# Patient Record
Sex: FEMALE | Race: WHITE | Employment: OTHER | URBAN - METROPOLITAN AREA
[De-identification: names, ages, dates, MRNs, and addresses within clinical notes are randomized per-mention and may not be internally consistent; named-entity substitution may affect disease eponyms.]

---

## 2018-08-08 ENCOUNTER — HOSPITAL ENCOUNTER (EMERGENCY)
Age: 68
Discharge: HOME | End: 2018-08-08
Attending: EMERGENCY MEDICINE | Admitting: EMERGENCY MEDICINE
Payer: MEDICARE

## 2018-08-08 ENCOUNTER — APPOINTMENT (OUTPATIENT)
Dept: GENERAL RADIOLOGY | Age: 68
End: 2018-08-08
Attending: EMERGENCY MEDICINE
Payer: MEDICARE

## 2018-08-08 VITALS
DIASTOLIC BLOOD PRESSURE: 83 MMHG | WEIGHT: 125 LBS | TEMPERATURE: 97.7 F | SYSTOLIC BLOOD PRESSURE: 183 MMHG | BODY MASS INDEX: 20.09 KG/M2 | RESPIRATION RATE: 16 BRPM | HEIGHT: 66 IN | HEART RATE: 60 BPM | OXYGEN SATURATION: 100 %

## 2018-08-08 DIAGNOSIS — S82.65XA CLOSED NONDISPLACED FRACTURE OF LATERAL MALLEOLUS OF LEFT FIBULA, INITIAL ENCOUNTER: Primary | ICD-10-CM

## 2018-08-08 PROCEDURE — 99283 EMERGENCY DEPT VISIT LOW MDM: CPT

## 2018-08-08 PROCEDURE — 73610 X-RAY EXAM OF ANKLE: CPT

## 2018-08-08 PROCEDURE — 75810000053 HC SPLINT APPLICATION

## 2018-08-08 PROCEDURE — 77030019945 HC PDNG CST 3M -A

## 2018-08-08 RX ORDER — TRAZODONE HYDROCHLORIDE 50 MG/1
50 TABLET ORAL
COMMUNITY

## 2018-08-08 NOTE — ED TRIAGE NOTES
Pt was wheeled to the treatment area. Pt states \"On Monday night I was kneeling on the floor playing with a puppy and my boyfriend stepped on my left foot by accident hurting my ankle. It still hurts. \"

## 2018-08-08 NOTE — ED PROVIDER NOTES
Patient is a 78 y.o. female presenting with ankle pain. The history is provided by the patient. Ankle Pain    This is a new problem. The current episode started 2 days ago. The problem occurs constantly. The pain is present in the left ankle. The quality of the pain is described as aching. The pain is at a severity of 1/10. There has been a history of trauma ( accidently stepped on her as he got up from couch). Past Medical History:   Diagnosis Date    Arthritis     osteoperosis    Hypertension     Psychiatric disorder     anxiety       Past Surgical History:   Procedure Laterality Date    HX GYN      Tubal ligation    HX HYSTERECTOMY      HX ORTHOPAEDIC  2013    right heel fx repair         History reviewed. No pertinent family history. Social History     Social History    Marital status:      Spouse name: N/A    Number of children: N/A    Years of education: N/A     Occupational History    Not on file. Social History Main Topics    Smoking status: Former Smoker     Packs/day: 0.50    Smokeless tobacco: Former User     Quit date: 8/8/2015    Alcohol use 8.4 oz/week     7 Shots of liquor, 7 Glasses of wine per week    Drug use: No    Sexual activity: Not on file     Other Topics Concern    Not on file     Social History Narrative         ALLERGIES: Review of patient's allergies indicates no known allergies. Review of Systems   Musculoskeletal:        Left ankle pain - other wise no other issues   remaining ROS neg as patient only has the left ankle pain    Vitals:    08/08/18 0959   BP: (!) 196/92   Pulse: 72   Resp: 16   Temp: 97.7 °F (36.5 °C)   SpO2: 100%   Weight: 56.7 kg (125 lb)   Height: 5' 6\" (1.676 m)            Physical Exam   Constitutional: She appears well-developed and well-nourished. Cardiovascular: Normal rate, regular rhythm and intact distal pulses. Musculoskeletal: She exhibits tenderness. She exhibits no deformity.         Left ankle: She exhibits swelling and ecchymosis. Tenderness. Lateral malleolus tenderness found. Feet:    Nursing note and vitals reviewed. MDM  Number of Diagnoses or Management Options  Closed nondisplaced fracture of lateral malleolus of left fibula, initial encounter:   Diagnosis management comments: Left ankle injury - fracture vs contusion vs sprain - check x-ray and re-eval.  Patient declined analgesia at time of my initial exam.    1100 - patient to be splinted and d/c to [facility] - she requested someone other than Dr. Aubery Sandifer or his group - referred to Dr. Leelee Elliott of [facility]  Splint applied with nurse and tech assistance       Amount and/or Complexity of Data Reviewed  Tests in the radiology section of CPT®: ordered and reviewed          ED Course       Splint, Ankle  Date/Time: 8/8/2018 11:10 AM  Performed by: Tierney Viveros  Authorized by: Tierney JEWELL     Consent:     Consent obtained:  Verbal    Consent given by:  Patient    Risks discussed:  Pain    Alternatives discussed:  Referral  Pre-procedure details:     Sensation:  Normal  Procedure details:     Laterality:  Left    Location:  Ankle    Ankle:  L ankle    Splint type: posterior wtih ankle stirrup. Supplies:  Ortho-Glass, cotton padding and elastic bandage                 VITALS:   Patient Vitals for the past 8 hrs:   Temp Pulse Resp BP SpO2   08/08/18 0959 97.7 °F (36.5 °C) 72 16 (!) 196/92 100 %        Xr Ankle Lt Min 3 V    Result Date: 8/8/2018  EXAM:  XR ANKLE LT MIN 3 V HISTORY: Tenderness over lateral malleolus INDICATION:  Tenderness over lateral malleolus. COMPARISON: 4/16/2013. FINDINGS: Three views of the left ankle demonstrate an acute minimally displaced distal fibular fracture. Edema over the lateral malleolus. Likely joint effusion. .  There is no other acute osseous or articular abnormality. Tibiotalar articulation is within normal limits. IMPRESSION: Distal fibular minimally displaced fracture.  There is no tibial fracture.

## 2018-08-08 NOTE — DISCHARGE INSTRUCTIONS
Broken Ankle: Care Instructions  Your Care Instructions    An ankle may break (fracture) during sports, a fall, or other accidents. Fractures can range from a small, hairline crack, to a bone or bones broken into two or more pieces. Your treatment depends on how bad the break is. Your doctor may have put your ankle in a splint or cast to allow it to heal or to keep it stable until you see another doctor. It may take weeks or months for your ankle to heal. You can help your ankle heal with some care at home. You heal best when you take good care of yourself. Eat a variety of healthy foods, and don't smoke. You may have had a sedative to help you relax. You may be unsteady after having sedation. It can take a few hours for the medicine's effects to wear off. Common side effects of sedation include nausea, vomiting, and feeling sleepy or tired. The doctor has checked you carefully, but problems can develop later. If you notice any problems or new symptoms,  get medical treatment right away. Follow-up care is a key part of your treatment and safety. Be sure to make and go to all appointments, and call your doctor if you are having problems. It's also a good idea to know your test results and keep a list of the medicines you take. How can you care for yourself at home? · If the doctor gave you a sedative:  ¨ For 24 hours, don't do anything that requires attention to detail. It takes time for the medicine's effects to completely wear off. ¨ For your safety, do not drive or operate any machinery that could be dangerous. Wait until the medicine wears off and you can think clearly and react easily. · Put ice or a cold pack on your ankle for 10 to 20 minutes at a time. Try to do this every 1 to 2 hours for the next 3 days (when you are awake). Put a thin cloth between the ice and your cast or splint. Keep your cast or splint dry. · Follow the cast care instructions your doctor gives you.  If you have a splint, do not take it off unless your doctor tells you to. · Be safe with medicines. Take pain medicines exactly as directed. ¨ If the doctor gave you a prescription medicine for pain, take it as prescribed. ¨ If you are not taking a prescription pain medicine, ask your doctor if you can take an over-the-counter medicine. · Prop up your leg on pillows in the first few days after the injury. Keep the ankle higher than the level of your heart. This will help reduce swelling. · Do not put weight on your ankle unless your doctor tells you to. Use crutches to walk. · Follow instructions for exercises to keep your leg strong. · Wiggle your toes often to reduce swelling and stiffness. When should you call for help? Call 911 anytime you think you may need emergency care. For example, call if:    · You have chest pain, are short of breath, or you cough up blood.     · You are very sleepy and you have trouble waking up.    Call your doctor now or seek immediate medical care if:    · You have new or worse nausea or vomiting.     · You have new or worse pain.     · Your foot is cool or pale or changes color.     · You have tingling, weakness, or numbness in your toes.     · Your cast or splint feels too tight.     · You have signs of a blood clot in your leg (called a deep vein thrombosis), such as:  ¨ Pain in your calf, back of the knee, thigh, or groin. ¨ Redness or swelling in your leg.    Watch closely for changes in your health, and be sure to contact your doctor if:    · You have a problem with your splint or cast.     · You do not get better as expected. Where can you learn more? Go to http://santi-kat.info/. Enter P763 in the search box to learn more about \"Broken Ankle: Care Instructions. \"  Current as of: November 29, 2017  Content Version: 11.7  © 3059-3003 [employer].  Care instructions adapted under license by [employer] (which disclaims liability or warranty for this information). If you have questions about a medical condition or this instruction, always ask your healthcare professional. [place] [address] any warranty or liability for your use of this information.

## 2018-08-08 NOTE — ED NOTES
Pt was discharged and given instructions by Dr Lamin Chau. Pt verbalized good understanding of all discharge instructions, and F/U care. All questions answered. Pt in stable condition on discharge.

## 2022-08-05 ENCOUNTER — HOSPITAL ENCOUNTER (OUTPATIENT)
Dept: INFUSION THERAPY | Age: 72
Discharge: HOME | End: 2022-08-05

## 2022-08-11 ENCOUNTER — HOSPITAL ENCOUNTER (OUTPATIENT)
Dept: INFUSION THERAPY | Age: 72
Discharge: HOME | End: 2022-08-11
Payer: MEDICARE

## 2022-08-11 VITALS
DIASTOLIC BLOOD PRESSURE: 69 MMHG | HEIGHT: 66 IN | OXYGEN SATURATION: 100 % | TEMPERATURE: 98 F | RESPIRATION RATE: 16 BRPM | SYSTOLIC BLOOD PRESSURE: 143 MMHG | WEIGHT: 128.9 LBS | BODY MASS INDEX: 20.72 KG/M2 | HEART RATE: 69 BPM

## 2022-08-11 LAB
ANION GAP BLD CALC-SCNC: 8 MMOL/L (ref 10–20)
CA-I BLD-MCNC: 1.31 MMOL/L (ref 1.12–1.32)
CHLORIDE BLD-SCNC: 102 MMOL/L (ref 98–107)
CO2 BLD-SCNC: 25.9 MMOL/L (ref 21–32)
CREAT BLD-MCNC: 0.88 MG/DL (ref 0.6–1.3)
GLUCOSE BLD-MCNC: 78 MG/DL (ref 65–100)
MAGNESIUM SERPL-MCNC: 2 MG/DL (ref 1.6–2.4)
PHOSPHATE SERPL-MCNC: 4.7 MG/DL (ref 2.6–4.7)
POTASSIUM BLD-SCNC: 3.8 MMOL/L (ref 3.5–5.1)
SERVICE CMNT-IMP: ABNORMAL
SODIUM BLD-SCNC: 135 MMOL/L (ref 136–145)

## 2022-08-11 PROCEDURE — 96372 THER/PROPH/DIAG INJ SC/IM: CPT

## 2022-08-11 PROCEDURE — 80047 BASIC METABLC PNL IONIZED CA: CPT

## 2022-08-11 PROCEDURE — 36415 COLL VENOUS BLD VENIPUNCTURE: CPT

## 2022-08-11 PROCEDURE — 80048 BASIC METABOLIC PNL TOTAL CA: CPT

## 2022-08-11 PROCEDURE — 83735 ASSAY OF MAGNESIUM: CPT

## 2022-08-11 PROCEDURE — 74011250636 HC RX REV CODE- 250/636: Performed by: FAMILY MEDICINE

## 2022-08-11 PROCEDURE — 84100 ASSAY OF PHOSPHORUS: CPT

## 2022-08-11 RX ORDER — TRAZODONE HYDROCHLORIDE 50 MG/1
TABLET ORAL
COMMUNITY

## 2022-08-11 RX ORDER — LOSARTAN POTASSIUM 100 MG/1
100 TABLET ORAL DAILY
COMMUNITY

## 2022-08-11 RX ORDER — NITROFURANTOIN (MACROCRYSTALS) 100 MG/1
CAPSULE ORAL
COMMUNITY
Start: 2022-08-08 | End: 2022-08-15

## 2022-08-11 RX ORDER — AMLODIPINE BESYLATE 5 MG/1
5 TABLET ORAL DAILY
COMMUNITY

## 2022-08-11 RX ADMIN — DENOSUMAB 60 MG: 60 INJECTION SUBCUTANEOUS at 08:41

## 2022-08-11 NOTE — PROGRESS NOTES
[facility] Progress Note    Date: 2022    Name: Anne Rojas    MRN: [id number]         : 1950    Ms. Sky arrived ambulatory and in no distress for Prolia Injection. Assessment was completed, no acute issues at this time, no new complaints voiced. Labs drawn and sent for processing. Patient denies any recent or planned dental work. Ms. Jono Chen vitals were reviewed. Visit Vitals  BP (!) 143/69 (BP 1 Location: Left upper arm, BP Patient Position: Sitting)   Pulse 69   Temp 98 °F (36.7 °C)   Resp 16   Ht 5' 5.75\" (1.67 m)   Wt 58.5 kg (128 lb 14.4 oz)   SpO2 100%   Breastfeeding No   BMI 20.96 kg/m²       Recent Results (from the past 24 hour(s))   POC CHEM8    Collection Time: 22  8:21 AM   Result Value Ref Range    Calcium, ionized (POC) 1.31 1.12 - 1.32 mmol/L    Sodium (POC) 135 (L) 136 - 145 mmol/L    Potassium (POC) 3.8 3.5 - 5.1 mmol/L    Chloride (POC) 102 98 - 107 mmol/L    CO2 (POC) 25.9 21 - 32 mmol/L    Anion gap (POC) 8 (L) 10 - 20 mmol/L    Glucose (POC) 78 65 - 100 mg/dL    Creatinine (POC) 0.88 0.6 - 1.3 mg/dL    GFRAA, POC >60 >60 ml/min/1.73m2    GFRNA, POC >60 >60 ml/min/1.73m2    Comment Comment Not Indicated. Mag and phos sent to the lab and still in process. Medications:  Medications Administered       denosumab (PROLIA) injection 60 mg       Admin Date  2022 Action  Given Dose  60 mg Route  SubCUTAneous Administered By  Tyler Brown RN                     Ms. Francisco Newton tolerated treatment well and was discharged from [place] [address] in stable condition. She is to return on  at 1300 for her next appointment.     Jamie Marley RN  2022

## 2022-09-14 ENCOUNTER — TRANSCRIBE ORDER (OUTPATIENT)
Dept: SCHEDULING | Age: 72
End: 2022-09-14

## 2022-09-14 DIAGNOSIS — Z12.31 ENCOUNTER FOR SCREENING MAMMOGRAM FOR MALIGNANT NEOPLASM OF BREAST: Primary | ICD-10-CM

## 2022-09-15 ENCOUNTER — TRANSCRIBE ORDER (OUTPATIENT)
Dept: SCHEDULING | Age: 72
End: 2022-09-15

## 2022-09-15 DIAGNOSIS — Z13.6 SCREENING FOR CARDIOVASCULAR CONDITION: Primary | ICD-10-CM

## 2022-09-15 DIAGNOSIS — Z78.0 ASYMPTOMATIC MENOPAUSAL STATE: Primary | ICD-10-CM

## 2022-09-15 DIAGNOSIS — G45.9 TRANSIENT CEREBRAL ISCHEMIA: Primary | ICD-10-CM

## 2022-10-11 ENCOUNTER — HOSPITAL ENCOUNTER (OUTPATIENT)
Dept: MRI IMAGING | Age: 72
Discharge: HOME | End: 2022-10-11
Attending: FAMILY MEDICINE
Payer: MEDICARE

## 2022-10-11 ENCOUNTER — HOSPITAL ENCOUNTER (OUTPATIENT)
Dept: CT IMAGING | Age: 72
Discharge: HOME | End: 2022-10-11
Attending: FAMILY MEDICINE
Payer: SELF-PAY

## 2022-10-11 VITALS — BODY MASS INDEX: 20.49 KG/M2 | WEIGHT: 126 LBS

## 2022-10-11 DIAGNOSIS — Z13.6 SCREENING FOR CARDIOVASCULAR CONDITION: ICD-10-CM

## 2022-10-11 DIAGNOSIS — G45.9 TRANSIENT CEREBRAL ISCHEMIA: ICD-10-CM

## 2022-10-11 PROCEDURE — 75571 CT HRT W/O DYE W/CA TEST: CPT

## 2022-10-11 PROCEDURE — 70553 MRI BRAIN STEM W/O & W/DYE: CPT

## 2022-10-11 PROCEDURE — 74011250636 HC RX REV CODE- 250/636: Performed by: RADIOLOGY

## 2022-10-11 PROCEDURE — A9576 INJ PROHANCE MULTIPACK: HCPCS | Performed by: RADIOLOGY

## 2022-10-11 RX ADMIN — GADOTERIDOL 11 ML: 279.3 INJECTION, SOLUTION INTRAVENOUS at 10:26

## 2022-10-19 ENCOUNTER — HOSPITAL ENCOUNTER (OUTPATIENT)
Dept: MAMMOGRAPHY | Age: 72
Discharge: HOME | End: 2022-10-19
Attending: FAMILY MEDICINE
Payer: MEDICARE

## 2022-10-19 DIAGNOSIS — Z12.31 ENCOUNTER FOR SCREENING MAMMOGRAM FOR MALIGNANT NEOPLASM OF BREAST: ICD-10-CM

## 2022-10-19 DIAGNOSIS — Z78.0 ASYMPTOMATIC MENOPAUSAL STATE: ICD-10-CM

## 2022-10-19 PROCEDURE — 77067 SCR MAMMO BI INCL CAD: CPT

## 2022-10-19 PROCEDURE — 77080 DXA BONE DENSITY AXIAL: CPT

## 2023-02-13 ENCOUNTER — HOSPITAL ENCOUNTER (OUTPATIENT)
Dept: INFUSION THERAPY | Age: 73
End: 2023-02-13

## 2023-02-27 ENCOUNTER — HOSPITAL ENCOUNTER (OUTPATIENT)
Dept: INFUSION THERAPY | Age: 73
Discharge: HOME | End: 2023-02-27
Payer: MEDICARE

## 2023-02-27 VITALS
SYSTOLIC BLOOD PRESSURE: 113 MMHG | DIASTOLIC BLOOD PRESSURE: 63 MMHG | OXYGEN SATURATION: 98 % | TEMPERATURE: 97.8 F | RESPIRATION RATE: 16 BRPM | BODY MASS INDEX: 21.99 KG/M2 | HEIGHT: 65 IN | WEIGHT: 132 LBS | HEART RATE: 75 BPM

## 2023-02-27 LAB
ANION GAP BLD CALC-SCNC: 13 MMOL/L (ref 10–20)
CA-I BLD-MCNC: 1.15 MMOL/L (ref 1.12–1.32)
CHLORIDE BLD-SCNC: 103 MMOL/L (ref 98–107)
CO2 BLD-SCNC: 26.1 MMOL/L (ref 21–32)
CREAT BLD-MCNC: 0.89 MG/DL (ref 0.6–1.3)
GLUCOSE BLD-MCNC: 110 MG/DL (ref 65–100)
MAGNESIUM SERPL-MCNC: 2 MG/DL (ref 1.6–2.4)
PHOSPHATE SERPL-MCNC: 3.6 MG/DL (ref 2.6–4.7)
POTASSIUM BLD-SCNC: 3.8 MMOL/L (ref 3.5–5.1)
SERVICE CMNT-IMP: ABNORMAL
SODIUM BLD-SCNC: 141 MMOL/L (ref 136–145)

## 2023-02-27 PROCEDURE — 80047 BASIC METABLC PNL IONIZED CA: CPT

## 2023-02-27 PROCEDURE — 36415 COLL VENOUS BLD VENIPUNCTURE: CPT

## 2023-02-27 PROCEDURE — 83735 ASSAY OF MAGNESIUM: CPT

## 2023-02-27 PROCEDURE — 80048 BASIC METABOLIC PNL TOTAL CA: CPT

## 2023-02-27 PROCEDURE — 84100 ASSAY OF PHOSPHORUS: CPT

## 2023-02-27 PROCEDURE — 74011250636 HC RX REV CODE- 250/636: Performed by: FAMILY MEDICINE

## 2023-02-27 PROCEDURE — 96372 THER/PROPH/DIAG INJ SC/IM: CPT

## 2023-02-27 RX ADMIN — DENOSUMAB 60 MG: 60 INJECTION SUBCUTANEOUS at 11:53

## 2023-06-28 ENCOUNTER — HOSPITAL ENCOUNTER (OUTPATIENT)
Facility: HOSPITAL | Age: 73
Discharge: HOME | End: 2023-07-01
Payer: MEDICARE

## 2023-06-28 DIAGNOSIS — R06.09 DYSPNEA ON EXERTION: ICD-10-CM

## 2023-06-28 PROCEDURE — 71046 X-RAY EXAM CHEST 2 VIEWS: CPT

## 2023-08-21 ENCOUNTER — APPOINTMENT (OUTPATIENT)
Dept: INFUSION THERAPY | Age: 73
End: 2023-08-21
Payer: MEDICARE

## 2023-08-23 PROBLEM — M81.0 OSTEOPOROSIS: Status: ACTIVE | Noted: 2023-08-23

## 2023-08-23 NOTE — PROGRESS NOTES
New/updated order required for patient's upcoming [facility] appointment. Faxed doctor's office on 08/23/23 at 8:17 AM.  Refer to fax documents in pharmacy for further information.

## 2023-08-28 ENCOUNTER — APPOINTMENT (OUTPATIENT)
Dept: INFUSION THERAPY | Age: 73
End: 2023-08-28

## 2023-08-28 ENCOUNTER — HOSPITAL ENCOUNTER (OUTPATIENT)
Facility: HOSPITAL | Age: 73
Setting detail: INFUSION SERIES
End: 2023-08-28
Payer: MEDICARE

## 2023-08-28 VITALS
SYSTOLIC BLOOD PRESSURE: 118 MMHG | HEART RATE: 65 BPM | HEIGHT: 65 IN | DIASTOLIC BLOOD PRESSURE: 66 MMHG | RESPIRATION RATE: 18 BRPM | OXYGEN SATURATION: 100 % | BODY MASS INDEX: 22.49 KG/M2 | WEIGHT: 135 LBS | TEMPERATURE: 97.9 F

## 2023-08-28 DIAGNOSIS — M81.0 OSTEOPOROSIS, UNSPECIFIED OSTEOPOROSIS TYPE, UNSPECIFIED PATHOLOGICAL FRACTURE PRESENCE: Primary | ICD-10-CM

## 2023-08-28 LAB
ANION GAP BLD CALC-SCNC: 7 MMOL/L (ref 10–20)
CA-I BLD-MCNC: 1.29 MMOL/L (ref 1.12–1.32)
CHLORIDE BLD-SCNC: 105 MMOL/L (ref 98–107)
CO2 BLD-SCNC: 24.8 MMOL/L (ref 21–32)
CREAT BLD-MCNC: 1.02 MG/DL (ref 0.6–1.3)
GLUCOSE BLD-MCNC: 94 MG/DL (ref 65–100)
MAGNESIUM SERPL-MCNC: 2 MG/DL (ref 1.6–2.4)
PHOSPHATE SERPL-MCNC: 5.4 MG/DL (ref 2.6–4.7)
POTASSIUM BLD-SCNC: 4.7 MMOL/L (ref 3.5–5.1)
SERVICE CMNT-IMP: ABNORMAL
SODIUM BLD-SCNC: 136 MMOL/L (ref 136–145)

## 2023-08-28 PROCEDURE — 96372 THER/PROPH/DIAG INJ SC/IM: CPT

## 2023-08-28 PROCEDURE — 83735 ASSAY OF MAGNESIUM: CPT

## 2023-08-28 PROCEDURE — 80047 BASIC METABLC PNL IONIZED CA: CPT

## 2023-08-28 PROCEDURE — 6360000002 HC RX W HCPCS: Performed by: FAMILY MEDICINE

## 2023-08-28 PROCEDURE — 84100 ASSAY OF PHOSPHORUS: CPT

## 2023-08-28 PROCEDURE — 36415 COLL VENOUS BLD VENIPUNCTURE: CPT

## 2023-08-28 RX ORDER — ROSUVASTATIN CALCIUM 10 MG/1
10 TABLET, COATED ORAL 2 TIMES DAILY
COMMUNITY

## 2023-08-28 RX ADMIN — DENOSUMAB 60 MG: 60 INJECTION SUBCUTANEOUS at 12:10

## 2023-08-28 ASSESSMENT — PAIN SCALES - GENERAL: PAINLEVEL_OUTOF10: 0

## 2024-02-29 ENCOUNTER — HOSPITAL ENCOUNTER (OUTPATIENT)
Facility: HOSPITAL | Age: 74
Setting detail: INFUSION SERIES
End: 2024-02-29

## 2024-03-12 ENCOUNTER — HOSPITAL ENCOUNTER (OUTPATIENT)
Facility: HOSPITAL | Age: 74
Setting detail: INFUSION SERIES
Discharge: HOME | End: 2024-03-12
Payer: MEDICARE

## 2024-03-12 VITALS
SYSTOLIC BLOOD PRESSURE: 147 MMHG | RESPIRATION RATE: 18 BRPM | BODY MASS INDEX: 23.04 KG/M2 | TEMPERATURE: 98.4 F | HEIGHT: 65 IN | WEIGHT: 138.3 LBS | OXYGEN SATURATION: 100 % | HEART RATE: 66 BPM | DIASTOLIC BLOOD PRESSURE: 80 MMHG

## 2024-03-12 DIAGNOSIS — M81.0 OSTEOPOROSIS, UNSPECIFIED OSTEOPOROSIS TYPE, UNSPECIFIED PATHOLOGICAL FRACTURE PRESENCE: Primary | ICD-10-CM

## 2024-03-12 LAB
ANION GAP BLD CALC-SCNC: 9.4 MMOL/L (ref 10–20)
CA-I BLD-MCNC: 1.18 MMOL/L (ref 1.12–1.32)
CHLORIDE BLD-SCNC: 103 MMOL/L (ref 98–107)
CO2 BLD-SCNC: 23.6 MMOL/L (ref 21–32)
CREAT BLD-MCNC: 0.99 MG/DL (ref 0.6–1.3)
GLUCOSE BLD-MCNC: 88 MG/DL (ref 65–100)
MAGNESIUM SERPL-MCNC: 1.8 MG/DL (ref 1.6–2.4)
PHOSPHATE SERPL-MCNC: 4.3 MG/DL (ref 2.6–4.7)
POTASSIUM BLD-SCNC: 3.9 MMOL/L (ref 3.5–5.1)
SERVICE CMNT-IMP: ABNORMAL
SODIUM BLD-SCNC: 136 MMOL/L (ref 136–145)

## 2024-03-12 PROCEDURE — 36415 COLL VENOUS BLD VENIPUNCTURE: CPT

## 2024-03-12 PROCEDURE — 6360000002 HC RX W HCPCS: Performed by: FAMILY MEDICINE

## 2024-03-12 PROCEDURE — 96372 THER/PROPH/DIAG INJ SC/IM: CPT

## 2024-03-12 PROCEDURE — 83735 ASSAY OF MAGNESIUM: CPT

## 2024-03-12 PROCEDURE — 84100 ASSAY OF PHOSPHORUS: CPT

## 2024-03-12 PROCEDURE — 80047 BASIC METABLC PNL IONIZED CA: CPT

## 2024-03-12 RX ADMIN — DENOSUMAB 60 MG: 60 INJECTION SUBCUTANEOUS at 15:06

## 2024-03-12 ASSESSMENT — PAIN DESCRIPTION - LOCATION: LOCATION: BACK

## 2024-03-12 ASSESSMENT — PAIN SCALES - GENERAL: PAINLEVEL_OUTOF10: 3

## 2024-03-12 ASSESSMENT — PAIN DESCRIPTION - ORIENTATION: ORIENTATION: LOWER

## 2024-03-12 NOTE — PROGRESS NOTES
hospitals Progress Note    Date: March 12, 2024        1430: Pt arrived ambulatory to hospitals for Prolia in stable condition.  Assessment completed. Labs drawn peripherally and sent for processing.    Labs reviewed. Criteria for treatment was met.    Patient Vitals for the past 12 hrs:   Temp Pulse Resp BP SpO2   03/12/24 1430 98.4 °F (36.9 °C) 66 18 (!) 147/80 100 %       Recent Results (from the past 12 hour(s))   POC CHEM 8    Collection Time: 03/12/24  2:44 PM   Result Value Ref Range    POC Ionized Calcium 1.18 1.12 - 1.32 mmol/L    POC Sodium 136 136 - 145 mmol/L    POC Potassium 3.9 3.5 - 5.1 mmol/L    POC Chloride 103 98 - 107 mmol/L    POC TCO2 23.6 21 - 32 mmol/L    Anion Gap, POC 9.4 (L) 10 - 20 mmol/L    POC Glucose 88 65 - 100 mg/dL    POC Creatinine 0.99 0.6 - 1.3 mg/dL    eGFR, POC >60 >60 ml/min/1.73m2    UA Comment Comment Not Indicated.         Medications Administered         denosumab (PROLIA) SC injection 60 mg Admin Date  03/12/2024 Action  Given Dose  60 mg Route  SubCUTAneous Administered By  Mallory Khan, RN          Given SQ to LANA    1515:  Tolerated treatment well, no adverse reactions noted. D/Cd from hospitals ambulatory and in no distress.  Patient is aware of next scheduled hospitals appointment on 9/13/24.    Future Appointments   Date Time Provider Department Center   9/13/2024  2:30 PM SS INF5 CH4 <1H [facility]         Mallory Khan RN  March 12, 2024

## 2024-08-14 ENCOUNTER — HOSPITAL ENCOUNTER (EMERGENCY)
Facility: HOSPITAL | Age: 74
Discharge: HOME | End: 2024-08-14
Attending: STUDENT IN AN ORGANIZED HEALTH CARE EDUCATION/TRAINING PROGRAM
Payer: MEDICARE

## 2024-08-14 ENCOUNTER — APPOINTMENT (OUTPATIENT)
Facility: HOSPITAL | Age: 74
End: 2024-08-14
Payer: MEDICARE

## 2024-08-14 VITALS
SYSTOLIC BLOOD PRESSURE: 115 MMHG | HEIGHT: 65 IN | BODY MASS INDEX: 21.66 KG/M2 | DIASTOLIC BLOOD PRESSURE: 76 MMHG | OXYGEN SATURATION: 98 % | HEART RATE: 76 BPM | RESPIRATION RATE: 16 BRPM | TEMPERATURE: 99.4 F | WEIGHT: 130 LBS

## 2024-08-14 DIAGNOSIS — R11.0 CHRONIC NAUSEA: ICD-10-CM

## 2024-08-14 DIAGNOSIS — K59.00 CONSTIPATION, UNSPECIFIED CONSTIPATION TYPE: Primary | ICD-10-CM

## 2024-08-14 DIAGNOSIS — N12 PYELONEPHRITIS: ICD-10-CM

## 2024-08-14 DIAGNOSIS — R10.31 ABDOMINAL PAIN, RIGHT LOWER QUADRANT: ICD-10-CM

## 2024-08-14 LAB
ALBUMIN SERPL-MCNC: 4 G/DL (ref 3.5–5)
ALBUMIN/GLOB SERPL: 1 (ref 1.1–2.2)
ALP SERPL-CCNC: 83 U/L (ref 45–117)
ALT SERPL-CCNC: 39 U/L (ref 12–78)
ANION GAP SERPL CALC-SCNC: 21 MMOL/L (ref 5–15)
APPEARANCE UR: ABNORMAL
AST SERPL-CCNC: 47 U/L (ref 15–37)
BACTERIA URNS QL MICRO: NEGATIVE /HPF
BASOPHILS # BLD: 0 K/UL (ref 0–0.1)
BASOPHILS NFR BLD: 0 % (ref 0–1)
BILIRUB SERPL-MCNC: 1.1 MG/DL (ref 0.2–1)
BILIRUB UR QL: NEGATIVE
BUN SERPL-MCNC: 16 MG/DL (ref 6–20)
BUN/CREAT SERPL: 11 (ref 12–20)
CALCIUM SERPL-MCNC: 10.2 MG/DL (ref 8.5–10.1)
CHLORIDE SERPL-SCNC: 97 MMOL/L (ref 97–108)
CO2 SERPL-SCNC: 20 MMOL/L (ref 21–32)
COLOR UR: ABNORMAL
CREAT SERPL-MCNC: 1.4 MG/DL (ref 0.55–1.02)
DIFFERENTIAL METHOD BLD: ABNORMAL
EOSINOPHIL # BLD: 0 K/UL (ref 0–0.4)
EOSINOPHIL NFR BLD: 0 % (ref 0–7)
EPITH CASTS URNS QL MICRO: ABNORMAL /LPF
ERYTHROCYTE [DISTWIDTH] IN BLOOD BY AUTOMATED COUNT: 14.2 % (ref 11.5–14.5)
GLOBULIN SER CALC-MCNC: 3.9 G/DL (ref 2–4)
GLUCOSE SERPL-MCNC: 141 MG/DL (ref 65–100)
GLUCOSE UR STRIP.AUTO-MCNC: NEGATIVE MG/DL
HCT VFR BLD AUTO: 35.1 % (ref 35–47)
HGB BLD-MCNC: 12.1 G/DL (ref 11.5–16)
HGB UR QL STRIP: ABNORMAL
IMM GRANULOCYTES # BLD AUTO: 0.1 K/UL (ref 0–0.04)
IMM GRANULOCYTES NFR BLD AUTO: 1 % (ref 0–0.5)
KETONES UR QL STRIP.AUTO: 40 MG/DL
LEUKOCYTE ESTERASE UR QL STRIP.AUTO: NEGATIVE
LIPASE SERPL-CCNC: 107 U/L (ref 13–75)
LYMPHOCYTES # BLD: 0.8 K/UL (ref 0.8–3.5)
LYMPHOCYTES NFR BLD: 9 % (ref 12–49)
MCH RBC QN AUTO: 29.9 PG (ref 26–34)
MCHC RBC AUTO-ENTMCNC: 34.5 G/DL (ref 30–36.5)
MCV RBC AUTO: 86.7 FL (ref 80–99)
MONOCYTES # BLD: 0.5 K/UL (ref 0–1)
MONOCYTES NFR BLD: 6 % (ref 5–13)
NEUTS SEG # BLD: 7.3 K/UL (ref 1.8–8)
NEUTS SEG NFR BLD: 84 % (ref 32–75)
NITRITE UR QL STRIP.AUTO: NEGATIVE
NRBC # BLD: 0 K/UL (ref 0–0.01)
NRBC BLD-RTO: 0 PER 100 WBC
PH UR STRIP: 6 (ref 5–8)
PLATELET # BLD AUTO: 237 K/UL (ref 150–400)
PMV BLD AUTO: 9.5 FL (ref 8.9–12.9)
POTASSIUM SERPL-SCNC: 3.7 MMOL/L (ref 3.5–5.1)
PROT SERPL-MCNC: 7.9 G/DL (ref 6.4–8.2)
PROT UR STRIP-MCNC: ABNORMAL MG/DL
RBC # BLD AUTO: 4.05 M/UL (ref 3.8–5.2)
RBC #/AREA URNS HPF: ABNORMAL /HPF (ref 0–5)
RBC MORPH BLD: ABNORMAL
SODIUM SERPL-SCNC: 138 MMOL/L (ref 136–145)
SP GR UR REFRACTOMETRY: 1.02 (ref 1–1.03)
URINE CULTURE IF INDICATED: ABNORMAL
UROBILINOGEN UR QL STRIP.AUTO: 0.2 EU/DL (ref 0.2–1)
WBC # BLD AUTO: 8.7 K/UL (ref 3.6–11)
WBC URNS QL MICRO: ABNORMAL /HPF (ref 0–4)

## 2024-08-14 PROCEDURE — 96374 THER/PROPH/DIAG INJ IV PUSH: CPT

## 2024-08-14 PROCEDURE — 96375 TX/PRO/DX INJ NEW DRUG ADDON: CPT

## 2024-08-14 PROCEDURE — 87086 URINE CULTURE/COLONY COUNT: CPT

## 2024-08-14 PROCEDURE — 74176 CT ABD & PELVIS W/O CONTRAST: CPT

## 2024-08-14 PROCEDURE — 99284 EMERGENCY DEPT VISIT MOD MDM: CPT

## 2024-08-14 PROCEDURE — 80053 COMPREHEN METABOLIC PANEL: CPT

## 2024-08-14 PROCEDURE — 6360000002 HC RX W HCPCS: Performed by: STUDENT IN AN ORGANIZED HEALTH CARE EDUCATION/TRAINING PROGRAM

## 2024-08-14 PROCEDURE — 81001 URINALYSIS AUTO W/SCOPE: CPT

## 2024-08-14 PROCEDURE — 2580000003 HC RX 258: Performed by: STUDENT IN AN ORGANIZED HEALTH CARE EDUCATION/TRAINING PROGRAM

## 2024-08-14 PROCEDURE — 87147 CULTURE TYPE IMMUNOLOGIC: CPT

## 2024-08-14 PROCEDURE — 36415 COLL VENOUS BLD VENIPUNCTURE: CPT

## 2024-08-14 PROCEDURE — 85025 COMPLETE CBC W/AUTO DIFF WBC: CPT

## 2024-08-14 PROCEDURE — 83690 ASSAY OF LIPASE: CPT

## 2024-08-14 RX ORDER — DOCUSATE SODIUM 100 MG/1
100 CAPSULE, LIQUID FILLED ORAL 2 TIMES DAILY
Qty: 60 CAPSULE | Refills: 0 | Status: SHIPPED | OUTPATIENT
Start: 2024-08-14 | End: 2024-08-14

## 2024-08-14 RX ORDER — METOCLOPRAMIDE 10 MG/1
10 TABLET ORAL 4 TIMES DAILY PRN
Qty: 40 TABLET | Refills: 0 | Status: SHIPPED | OUTPATIENT
Start: 2024-08-14

## 2024-08-14 RX ORDER — ONDANSETRON 2 MG/ML
4 INJECTION INTRAMUSCULAR; INTRAVENOUS ONCE
Status: COMPLETED | OUTPATIENT
Start: 2024-08-14 | End: 2024-08-14

## 2024-08-14 RX ORDER — SULFAMETHOXAZOLE AND TRIMETHOPRIM 800; 160 MG/1; MG/1
1 TABLET ORAL 2 TIMES DAILY
Qty: 20 TABLET | Refills: 0 | Status: SHIPPED | OUTPATIENT
Start: 2024-08-14 | End: 2024-08-24

## 2024-08-14 RX ORDER — MORPHINE SULFATE 4 MG/ML
4 INJECTION, SOLUTION INTRAMUSCULAR; INTRAVENOUS
Status: COMPLETED | OUTPATIENT
Start: 2024-08-14 | End: 2024-08-14

## 2024-08-14 RX ORDER — CEPHALEXIN 500 MG/1
500 CAPSULE ORAL 2 TIMES DAILY
Qty: 10 CAPSULE | Refills: 0 | Status: SHIPPED | OUTPATIENT
Start: 2024-08-14 | End: 2024-08-14 | Stop reason: SINTOL

## 2024-08-14 RX ORDER — 0.9 % SODIUM CHLORIDE 0.9 %
1000 INTRAVENOUS SOLUTION INTRAVENOUS ONCE
Status: COMPLETED | OUTPATIENT
Start: 2024-08-14 | End: 2024-08-14

## 2024-08-14 RX ORDER — SENNA AND DOCUSATE SODIUM 50; 8.6 MG/1; MG/1
1 TABLET, FILM COATED ORAL DAILY PRN
Qty: 30 TABLET | Refills: 0 | Status: SHIPPED | OUTPATIENT
Start: 2024-08-14 | End: 2024-09-13

## 2024-08-14 RX ADMIN — MORPHINE SULFATE 4 MG: 4 INJECTION, SOLUTION INTRAMUSCULAR; INTRAVENOUS at 16:18

## 2024-08-14 RX ADMIN — ONDANSETRON 4 MG: 2 INJECTION INTRAMUSCULAR; INTRAVENOUS at 16:18

## 2024-08-14 RX ADMIN — SODIUM CHLORIDE 1000 ML: 9 INJECTION, SOLUTION INTRAVENOUS at 18:22

## 2024-08-14 RX ADMIN — WATER 1000 MG: 1 INJECTION INTRAMUSCULAR; INTRAVENOUS; SUBCUTANEOUS at 18:22

## 2024-08-14 ASSESSMENT — PAIN SCALES - GENERAL
PAINLEVEL_OUTOF10: 2
PAINLEVEL_OUTOF10: 10
PAINLEVEL_OUTOF10: 4
PAINLEVEL_OUTOF10: 2
PAINLEVEL_OUTOF10: 0

## 2024-08-14 ASSESSMENT — PAIN DESCRIPTION - LOCATION
LOCATION: ABDOMEN

## 2024-08-14 ASSESSMENT — PAIN DESCRIPTION - ORIENTATION
ORIENTATION: RIGHT;LOWER
ORIENTATION: RIGHT
ORIENTATION: RIGHT

## 2024-08-14 ASSESSMENT — PAIN DESCRIPTION - DESCRIPTORS
DESCRIPTORS: DULL
DESCRIPTORS: SHARP
DESCRIPTORS: SHARP

## 2024-08-14 ASSESSMENT — PAIN - FUNCTIONAL ASSESSMENT: PAIN_FUNCTIONAL_ASSESSMENT: PREVENTS OR INTERFERES WITH MANY ACTIVE NOT PASSIVE ACTIVITIES

## 2024-08-14 ASSESSMENT — PAIN DESCRIPTION - FREQUENCY: FREQUENCY: INTERMITTENT

## 2024-08-14 ASSESSMENT — LIFESTYLE VARIABLES: HOW OFTEN DO YOU HAVE A DRINK CONTAINING ALCOHOL: NEVER

## 2024-08-14 NOTE — ED PROVIDER NOTES
[facility] EMERGENCY DEPT  EMERGENCY DEPARTMENT ENCOUNTER      Pt Name: Valeria Lo  MRN: [id number]  Birthdate 1950  Date of evaluation: 8/14/2024  Provider: Beatris Celestin MD    CHIEF COMPLAINT       Chief Complaint   Patient presents with    Abdominal Pain         HISTORY OF PRESENT ILLNESS    Nursing Triage Notes were reviewed.    HPI    Valeria Lo is a 74 y.o. female with a history of HTN, HLD, H pylori (retesting negative), chronic pancreatitis, medical marijuana use who presents to the emergency department for evaluation of nausea, vomiting, abdominal pain. Complains of right lower quadrant pain abdominal pain that began this morning. Complains of new chills this morning, denies known fever. Complains of constipation chronically for > 1 month. Has been having bowel movements, but they are small and hard. Has seen PCP for this, diagnosed with anal fissure, has been taking Miralax, Mg citrate, glycerin suppositories, Fleets enema. Did Miralax daily x 3 days, glycerin suppository and Fleets enema x2 this AM without significant bowel movement. Tried a manual disimpaction herself but no stool within reach. Has had many colonoscopies in the past as her son passed away from colon cancer - last colonoscopy one year ago and normal.  Hx of hysterectomy, no other abdominal surgeries. Drinks at least 2 alcoholic drinks daily.     PAST MEDICAL HISTORY     Past Medical History:   Diagnosis Date    Arthritis     osteoperosis    Hypertension     Psychiatric disorder     anxiety         SURGICAL HISTORY       Past Surgical History:   Procedure Laterality Date    GYN      Tubal ligation    HYSTERECTOMY (CERVIX STATUS UNKNOWN)      ORTHOPEDIC SURGERY  2013    right heel fx repair         CURRENT MEDICATIONS       Discharge Medication List as of 8/14/2024  8:32 PM        CONTINUE these medications which have NOT CHANGED    Details   PANTOPRAZOLE SODIUM PO Take by mouthHistorical Med      rosuvastatin

## 2024-08-14 NOTE — ED TRIAGE NOTES
Patient presents ambulatory to treatment area with a steady gait.  Patient complains of severe right lower abdominal pain that began this morning.  Endorses nausea and vomiting.  Denies known fevers, but states she has had chills this morning.  Additionally states that she has been constipated recently; has used enema and mag citrate at home with no results.  Patient guarding when walking to room.

## 2024-08-14 NOTE — ED NOTES
Bedside and Verbal shift change report received from CON Avila (offgoing nurse). Report included the following information ED SBAR, MAR, and Recent Results.

## 2024-08-15 LAB
BACTERIA SPEC CULT: ABNORMAL
CC UR VC: ABNORMAL
SERVICE CMNT-IMP: ABNORMAL

## 2024-08-15 NOTE — DISCHARGE INSTRUCTIONS
You have been evaluated in the Emergency Department today for abdominal pain. Your evaluation, including urinalysis, blood work, CT scan suggests that your symptoms are due to a urinary tract infection. Please take your prescribed antibiotics for the full course of the medication as directed.    Please follow up with your primary care physician within two days.    Return to the Emergency Department if you experience fevers 100.4° or greater, worsening or uncontrolled pain, vomiting, flank pain, or for any other concerning symptoms.    Thank you for choosing us for your care.

## 2024-08-15 NOTE — FLOWSHEET NOTE
Discharge instructions given to pt by RN. Pt educated on prescribed medications in teach back method and verbalizes understanding. Opportunity for questions provided. Pt ambulatory out of unit, in no acute distress and taken home by .

## 2024-09-25 ENCOUNTER — TRANSCRIBE ORDERS (OUTPATIENT)
Facility: HOSPITAL | Age: 74
End: 2024-09-25

## 2024-09-25 DIAGNOSIS — Z12.31 ENCOUNTER FOR SCREENING MAMMOGRAM FOR MALIGNANT NEOPLASM OF BREAST: Primary | ICD-10-CM

## 2024-09-26 ENCOUNTER — TRANSCRIBE ORDERS (OUTPATIENT)
Facility: HOSPITAL | Age: 74
End: 2024-09-26

## 2024-09-26 DIAGNOSIS — R13.19 OTHER DYSPHAGIA: ICD-10-CM

## 2024-09-26 DIAGNOSIS — K30 FUNCTIONAL DYSPEPSIA: Primary | ICD-10-CM

## 2024-09-30 ENCOUNTER — HOSPITAL ENCOUNTER (OUTPATIENT)
Facility: HOSPITAL | Age: 74
Setting detail: INFUSION SERIES
Discharge: HOME | End: 2024-09-30
Payer: MEDICARE

## 2024-09-30 VITALS
SYSTOLIC BLOOD PRESSURE: 107 MMHG | TEMPERATURE: 98.7 F | HEART RATE: 66 BPM | BODY MASS INDEX: 21.14 KG/M2 | OXYGEN SATURATION: 100 % | HEIGHT: 65 IN | WEIGHT: 126.9 LBS | RESPIRATION RATE: 16 BRPM | DIASTOLIC BLOOD PRESSURE: 55 MMHG

## 2024-09-30 DIAGNOSIS — M81.0 OSTEOPOROSIS, UNSPECIFIED OSTEOPOROSIS TYPE, UNSPECIFIED PATHOLOGICAL FRACTURE PRESENCE: Primary | ICD-10-CM

## 2024-09-30 LAB
ANION GAP BLD CALC-SCNC: 8.7 MMOL/L (ref 10–20)
CA-I BLD-MCNC: 1.15 MMOL/L (ref 1.15–1.33)
CHLORIDE BLD-SCNC: 101 MMOL/L (ref 98–107)
CO2 BLD-SCNC: 24.3 MMOL/L (ref 21–32)
CREAT BLD-MCNC: 1 MG/DL (ref 0.6–1.3)
GLUCOSE BLD-MCNC: 147 MG/DL (ref 74–99)
PHOSPHATE SERPL-MCNC: 2.9 MG/DL (ref 2.6–4.7)
POTASSIUM BLD-SCNC: 3.9 MMOL/L (ref 3.5–5.1)
SERVICE CMNT-IMP: ABNORMAL
SODIUM BLD-SCNC: 134 MMOL/L (ref 136–145)

## 2024-09-30 PROCEDURE — 36415 COLL VENOUS BLD VENIPUNCTURE: CPT

## 2024-09-30 PROCEDURE — 6360000002 HC RX W HCPCS: Performed by: FAMILY MEDICINE

## 2024-09-30 PROCEDURE — 80047 BASIC METABLC PNL IONIZED CA: CPT

## 2024-09-30 PROCEDURE — 84100 ASSAY OF PHOSPHORUS: CPT

## 2024-09-30 PROCEDURE — 96372 THER/PROPH/DIAG INJ SC/IM: CPT

## 2024-09-30 RX ADMIN — DENOSUMAB 60 MG: 60 INJECTION SUBCUTANEOUS at 14:59

## 2024-09-30 ASSESSMENT — PAIN DESCRIPTION - LOCATION: LOCATION: BACK

## 2024-09-30 ASSESSMENT — PAIN SCALES - GENERAL: PAINLEVEL_OUTOF10: 0

## 2024-09-30 NOTE — PROGRESS NOTES
[facility]fusion [facility] Short Visit Progress Note    Ms. Lo admitted to \[facility]\"" for Prolia ambulatory in stable condition. Assessment completed. No new concerns voiced. No dental work to be done.    Vital Signs:  BP (!) 107/55   Pulse 66   Temp 98.7 °F (37.1 °C) (Temporal)   Resp 16   Ht 1.651 m (5' 5\")   Wt 57.6 kg (126 lb 14.4 oz)   SpO2 100%   BMI 21.12 kg/m²       R arm peripheral stick with positive blood return.   Lab Results:  Recent Results (from the past 12 hour(s))   POC CHEM 8    Collection Time: 09/30/24  2:37 PM   Result Value Ref Range    POC Ionized Calcium 1.15 1.15 - 1.33 mmol/L    POC Sodium 134 (L) 136 - 145 mmol/L    POC Potassium 3.9 3.5 - 5.1 mmol/L    POC Chloride 101 98 - 107 mmol/L    POC TCO2 24.3 21 - 32 mmol/L    Anion Gap, POC 8.7 (L) 10 - 20 mmol/L    POC Glucose 147 (H) 74 - 99 mg/dL    POC Creatinine 1.00 0.6 - 1.3 mg/dL    eGFR, POC 59 (L) >60 ml/min/1.73m2    UA Comment Comment Not Indicated.             Medications:  Medications Administered         denosumab (PROLIA) SC injection 60 mg Admin Date  09/30/2024 Action  Given Dose  60 mg Route  SubCUTAneous Documented By  Jossie Mae, RN            R arm SQ    Patient tolerated treatment well. Patient discharged from [facility] ambulatory in no distress. Patient aware of next appointment.    Jossie Mae RN  September 30, 2024    Future Appointments   Date Time Provider Department Center   10/3/2024  9:00 AM [facility] BRIANNA 1 [facility]   10/3/2024 10:00 AM [facility] NM RM 1 [facility]MRNM [facility]   11/19/2024  9:15 AM [facility] FLUORO 1 [facility]MRAD [facility]   3/31/2025  1:30 PM SS FASTTRACK 1 [facility]

## 2024-10-03 ENCOUNTER — HOSPITAL ENCOUNTER (OUTPATIENT)
Facility: HOSPITAL | Age: 74
Discharge: HOME | End: 2024-10-06
Payer: MEDICARE

## 2024-10-03 DIAGNOSIS — Z12.31 ENCOUNTER FOR SCREENING MAMMOGRAM FOR MALIGNANT NEOPLASM OF BREAST: ICD-10-CM

## 2024-10-03 DIAGNOSIS — K30 FUNCTIONAL DYSPEPSIA: ICD-10-CM

## 2024-10-03 PROCEDURE — 3430000000 HC RX DIAGNOSTIC RADIOPHARMACEUTICAL: Performed by: FAMILY MEDICINE

## 2024-10-03 PROCEDURE — A9541 TC99M SULFUR COLLOID: HCPCS | Performed by: FAMILY MEDICINE

## 2024-10-03 PROCEDURE — 77063 BREAST TOMOSYNTHESIS BI: CPT

## 2024-10-03 PROCEDURE — 78264 GASTRIC EMPTYING IMG STUDY: CPT

## 2024-10-03 RX ADMIN — TECHNETIUM TC 99M SULFUR COLLOID 0.3 MILLICURIE: KIT at 09:40

## 2024-12-04 ENCOUNTER — HOSPITAL ENCOUNTER (OUTPATIENT)
Facility: HOSPITAL | Age: 74
Discharge: HOME | End: 2024-12-07
Payer: MEDICARE

## 2024-12-04 DIAGNOSIS — R13.19 OTHER DYSPHAGIA: ICD-10-CM

## 2024-12-04 PROCEDURE — 74220 X-RAY XM ESOPHAGUS 1CNTRST: CPT

## 2025-03-02 ENCOUNTER — APPOINTMENT (OUTPATIENT)
Facility: HOSPITAL | Age: 75
DRG: 242 | End: 2025-03-02
Payer: MEDICARE

## 2025-03-02 ENCOUNTER — HOSPITAL ENCOUNTER (OUTPATIENT)
Facility: HOSPITAL | Age: 75
Setting detail: OBSERVATION
Discharge: HOME | DRG: 243 | End: 2025-03-05
Payer: MEDICARE

## 2025-03-02 ENCOUNTER — HOSPITAL ENCOUNTER (INPATIENT)
Facility: HOSPITAL | Age: 75
LOS: 4 days | Discharge: HOME | DRG: 242 | End: 2025-03-07
Attending: EMERGENCY MEDICINE | Admitting: HOSPITALIST
Payer: MEDICARE

## 2025-03-02 DIAGNOSIS — R00.1 BRADYCARDIA: ICD-10-CM

## 2025-03-02 DIAGNOSIS — I45.5 SINUS PAUSE: Primary | ICD-10-CM

## 2025-03-02 DIAGNOSIS — R53.83 OTHER FATIGUE: ICD-10-CM

## 2025-03-02 DIAGNOSIS — E87.20 METABOLIC ACIDOSIS: ICD-10-CM

## 2025-03-02 DIAGNOSIS — E87.20 LACTIC ACIDOSIS: ICD-10-CM

## 2025-03-02 DIAGNOSIS — E16.2 HYPOGLYCEMIA: ICD-10-CM

## 2025-03-02 DIAGNOSIS — R55 SYNCOPE, UNSPECIFIED SYNCOPE TYPE: ICD-10-CM

## 2025-03-02 DIAGNOSIS — T68.XXXA HYPOTHERMIA, INITIAL ENCOUNTER: ICD-10-CM

## 2025-03-02 LAB
ALBUMIN SERPL-MCNC: 4.3 G/DL (ref 3.5–5)
ALBUMIN/GLOB SERPL: 1.3 (ref 1.1–2.2)
ALP SERPL-CCNC: 71 U/L (ref 45–117)
ALT SERPL-CCNC: 25 U/L (ref 12–78)
AMPHET UR QL SCN: NEGATIVE
ANION GAP SERPL CALC-SCNC: 22 MMOL/L (ref 2–12)
APPEARANCE UR: CLEAR
AST SERPL-CCNC: 38 U/L (ref 15–37)
BACTERIA URNS QL MICRO: NEGATIVE /HPF
BARBITURATES UR QL SCN: NEGATIVE
BASE DEFICIT BLDV-SCNC: 13.8 MMOL/L
BASOPHILS # BLD: 0.06 K/UL (ref 0–0.1)
BASOPHILS NFR BLD: 0.6 % (ref 0–1)
BENZODIAZ UR QL: NEGATIVE
BILIRUB SERPL-MCNC: 0.9 MG/DL (ref 0.2–1)
BILIRUB UR QL: NEGATIVE
BUN SERPL-MCNC: 23 MG/DL (ref 6–20)
BUN/CREAT SERPL: 21 (ref 12–20)
CALCIUM SERPL-MCNC: 9.8 MG/DL (ref 8.5–10.1)
CANNABINOIDS UR QL SCN: POSITIVE
CHLORIDE SERPL-SCNC: 102 MMOL/L (ref 97–108)
CK SERPL-CCNC: 57 U/L (ref 26–192)
CO2 SERPL-SCNC: 13 MMOL/L (ref 21–32)
COCAINE UR QL SCN: NEGATIVE
COLOR UR: ABNORMAL
CORTIS AM PEAK SERPL-MCNC: 18.2 UG/DL (ref 4.3–22.45)
CREAT SERPL-MCNC: 1.09 MG/DL (ref 0.55–1.02)
DIFFERENTIAL METHOD BLD: ABNORMAL
EOSINOPHIL # BLD: 0.09 K/UL (ref 0–0.4)
EOSINOPHIL NFR BLD: 0.9 % (ref 0–7)
EPITH CASTS URNS QL MICRO: ABNORMAL /LPF
ERYTHROCYTE [DISTWIDTH] IN BLOOD BY AUTOMATED COUNT: 13.2 % (ref 11.5–14.5)
ETHANOL SERPL-MCNC: 37 MG/DL (ref 0–0.08)
FLUAV RNA SPEC QL NAA+PROBE: NOT DETECTED
FLUBV RNA SPEC QL NAA+PROBE: NOT DETECTED
GLOBULIN SER CALC-MCNC: 3.4 G/DL (ref 2–4)
GLUCOSE BLD STRIP.AUTO-MCNC: 101 MG/DL (ref 65–117)
GLUCOSE BLD STRIP.AUTO-MCNC: 127 MG/DL (ref 65–117)
GLUCOSE BLD STRIP.AUTO-MCNC: 146 MG/DL (ref 65–117)
GLUCOSE BLD STRIP.AUTO-MCNC: 174 MG/DL (ref 65–117)
GLUCOSE BLD STRIP.AUTO-MCNC: 255 MG/DL (ref 65–117)
GLUCOSE BLD STRIP.AUTO-MCNC: 78 MG/DL (ref 65–117)
GLUCOSE BLD STRIP.AUTO-MCNC: 93 MG/DL (ref 65–117)
GLUCOSE BLD-MCNC: 233 MG/DL (ref 74–99)
GLUCOSE SERPL-MCNC: 227 MG/DL (ref 65–100)
GLUCOSE UR STRIP.AUTO-MCNC: NEGATIVE MG/DL
HCO3 BLDV-SCNC: 12.6 MMOL/L (ref 23–28)
HCT VFR BLD AUTO: 38.6 % (ref 35–47)
HGB BLD-MCNC: 12.5 G/DL (ref 11.5–16)
HGB UR QL STRIP: NEGATIVE
HYALINE CASTS URNS QL MICRO: ABNORMAL /LPF (ref 0–2)
IMM GRANULOCYTES # BLD AUTO: 0.1 K/UL (ref 0–0.04)
IMM GRANULOCYTES NFR BLD AUTO: 1 % (ref 0–0.5)
KETONES UR QL STRIP.AUTO: 15 MG/DL
LACTATE BLD-SCNC: 7.83 MMOL/L (ref 0.4–2)
LACTATE SERPL-SCNC: 0.6 MMOL/L (ref 0.4–2)
LEUKOCYTE ESTERASE UR QL STRIP.AUTO: ABNORMAL
LIPASE SERPL-CCNC: 110 U/L (ref 13–75)
LYMPHOCYTES # BLD: 3.09 K/UL (ref 0.8–3.5)
LYMPHOCYTES NFR BLD: 32.1 % (ref 12–49)
Lab: ABNORMAL
MAGNESIUM SERPL-MCNC: 2 MG/DL (ref 1.6–2.4)
MCH RBC QN AUTO: 30.4 PG (ref 26–34)
MCHC RBC AUTO-ENTMCNC: 32.4 G/DL (ref 30–36.5)
MCV RBC AUTO: 93.9 FL (ref 80–99)
METHADONE UR QL: NEGATIVE
MONOCYTES # BLD: 0.34 K/UL (ref 0–1)
MONOCYTES NFR BLD: 3.5 % (ref 5–13)
NEUTS SEG # BLD: 5.95 K/UL (ref 1.8–8)
NEUTS SEG NFR BLD: 61.9 % (ref 32–75)
NITRITE UR QL STRIP.AUTO: NEGATIVE
NRBC # BLD: 0 K/UL (ref 0–0.01)
NRBC BLD-RTO: 0 PER 100 WBC
OPIATES UR QL: NEGATIVE
PCO2 BLDV: 30.4 MMHG (ref 41–51)
PCP UR QL: NEGATIVE
PH BLDV: 7.23 (ref 7.32–7.42)
PH UR STRIP: 5.5 (ref 5–8)
PHOSPHATE SERPL-MCNC: 2.9 MG/DL (ref 2.6–4.7)
PLATELET # BLD AUTO: 252 K/UL (ref 150–400)
PMV BLD AUTO: 9.7 FL (ref 8.9–12.9)
PO2 BLDV: 50 MMHG (ref 25–40)
POTASSIUM SERPL-SCNC: 3.1 MMOL/L (ref 3.5–5.1)
PROCALCITONIN SERPL-MCNC: <0.05 NG/ML
PROT SERPL-MCNC: 7.7 G/DL (ref 6.4–8.2)
PROT UR STRIP-MCNC: NEGATIVE MG/DL
RBC # BLD AUTO: 4.11 M/UL (ref 3.8–5.2)
RBC #/AREA URNS HPF: ABNORMAL /HPF (ref 0–5)
SAO2 % BLDV: 78.5 % (ref 65–88)
SARS-COV-2 RNA RESP QL NAA+PROBE: NOT DETECTED
SERVICE CMNT-IMP: ABNORMAL
SERVICE CMNT-IMP: NORMAL
SODIUM SERPL-SCNC: 137 MMOL/L (ref 136–145)
SOURCE: NORMAL
SP GR UR REFRACTOMETRY: 1.01 (ref 1–1.03)
SPECIMEN TYPE: ABNORMAL
TROPONIN I SERPL HS-MCNC: 11 NG/L (ref 0–51)
TROPONIN I SERPL HS-MCNC: 29 NG/L (ref 0–51)
TSH SERPL DL<=0.05 MIU/L-ACNC: 2.48 UIU/ML (ref 0.36–3.74)
URINE CULTURE IF INDICATED: ABNORMAL
UROBILINOGEN UR QL STRIP.AUTO: 0.2 EU/DL (ref 0.2–1)
WBC # BLD AUTO: 9.6 K/UL (ref 3.6–11)
WBC URNS QL MICRO: ABNORMAL /HPF (ref 0–4)

## 2025-03-02 PROCEDURE — 96361 HYDRATE IV INFUSION ADD-ON: CPT

## 2025-03-02 PROCEDURE — 96375 TX/PRO/DX INJ NEW DRUG ADDON: CPT

## 2025-03-02 PROCEDURE — 83735 ASSAY OF MAGNESIUM: CPT

## 2025-03-02 PROCEDURE — G0378 HOSPITAL OBSERVATION PER HR: HCPCS

## 2025-03-02 PROCEDURE — 2580000003 HC RX 258: Performed by: HOSPITALIST

## 2025-03-02 PROCEDURE — 96372 THER/PROPH/DIAG INJ SC/IM: CPT

## 2025-03-02 PROCEDURE — 87040 BLOOD CULTURE FOR BACTERIA: CPT

## 2025-03-02 PROCEDURE — 6360000002 HC RX W HCPCS: Performed by: FAMILY MEDICINE

## 2025-03-02 PROCEDURE — 87636 SARSCOV2 & INF A&B AMP PRB: CPT

## 2025-03-02 PROCEDURE — 82533 TOTAL CORTISOL: CPT

## 2025-03-02 PROCEDURE — 84100 ASSAY OF PHOSPHORUS: CPT

## 2025-03-02 PROCEDURE — 71260 CT THORAX DX C+: CPT

## 2025-03-02 PROCEDURE — 85025 COMPLETE CBC W/AUTO DIFF WBC: CPT

## 2025-03-02 PROCEDURE — 83690 ASSAY OF LIPASE: CPT

## 2025-03-02 PROCEDURE — 96374 THER/PROPH/DIAG INJ IV PUSH: CPT

## 2025-03-02 PROCEDURE — 82803 BLOOD GASES ANY COMBINATION: CPT

## 2025-03-02 PROCEDURE — 80307 DRUG TEST PRSMV CHEM ANLYZR: CPT

## 2025-03-02 PROCEDURE — 81001 URINALYSIS AUTO W/SCOPE: CPT

## 2025-03-02 PROCEDURE — 6360000004 HC RX CONTRAST MEDICATION: Performed by: HOSPITALIST

## 2025-03-02 PROCEDURE — 36415 COLL VENOUS BLD VENIPUNCTURE: CPT

## 2025-03-02 PROCEDURE — 80053 COMPREHEN METABOLIC PANEL: CPT

## 2025-03-02 PROCEDURE — 82962 GLUCOSE BLOOD TEST: CPT

## 2025-03-02 PROCEDURE — 82550 ASSAY OF CK (CPK): CPT

## 2025-03-02 PROCEDURE — 6360000002 HC RX W HCPCS: Performed by: HOSPITALIST

## 2025-03-02 PROCEDURE — 99285 EMERGENCY DEPT VISIT HI MDM: CPT

## 2025-03-02 PROCEDURE — 82077 ASSAY SPEC XCP UR&BREATH IA: CPT

## 2025-03-02 PROCEDURE — 84484 ASSAY OF TROPONIN QUANT: CPT

## 2025-03-02 PROCEDURE — 84443 ASSAY THYROID STIM HORMONE: CPT

## 2025-03-02 PROCEDURE — 6370000000 HC RX 637 (ALT 250 FOR IP): Performed by: FAMILY MEDICINE

## 2025-03-02 PROCEDURE — 6360000002 HC RX W HCPCS: Performed by: EMERGENCY MEDICINE

## 2025-03-02 PROCEDURE — 83605 ASSAY OF LACTIC ACID: CPT

## 2025-03-02 PROCEDURE — 84145 PROCALCITONIN (PCT): CPT

## 2025-03-02 PROCEDURE — 94761 N-INVAS EAR/PLS OXIMETRY MLT: CPT

## 2025-03-02 PROCEDURE — 71045 X-RAY EXAM CHEST 1 VIEW: CPT

## 2025-03-02 PROCEDURE — 2580000003 HC RX 258: Performed by: EMERGENCY MEDICINE

## 2025-03-02 PROCEDURE — 2500000003 HC RX 250 WO HCPCS: Performed by: HOSPITALIST

## 2025-03-02 PROCEDURE — 93005 ELECTROCARDIOGRAM TRACING: CPT | Performed by: EMERGENCY MEDICINE

## 2025-03-02 PROCEDURE — 2500000003 HC RX 250 WO HCPCS: Performed by: EMERGENCY MEDICINE

## 2025-03-02 RX ORDER — ENOXAPARIN SODIUM 100 MG/ML
40 INJECTION SUBCUTANEOUS DAILY
Status: DISCONTINUED | OUTPATIENT
Start: 2025-03-02 | End: 2025-03-03

## 2025-03-02 RX ORDER — TRAZODONE HYDROCHLORIDE 50 MG/1
50 TABLET ORAL NIGHTLY
Status: DISCONTINUED | OUTPATIENT
Start: 2025-03-02 | End: 2025-03-07 | Stop reason: HOSPADM

## 2025-03-02 RX ORDER — ONDANSETRON 4 MG/1
4 TABLET, ORALLY DISINTEGRATING ORAL EVERY 8 HOURS PRN
Status: DISCONTINUED | OUTPATIENT
Start: 2025-03-02 | End: 2025-03-07 | Stop reason: HOSPADM

## 2025-03-02 RX ORDER — ACETAMINOPHEN 325 MG/1
650 TABLET ORAL EVERY 6 HOURS PRN
Status: DISCONTINUED | OUTPATIENT
Start: 2025-03-02 | End: 2025-03-07 | Stop reason: HOSPADM

## 2025-03-02 RX ORDER — POLYETHYLENE GLYCOL 3350 17 G/17G
17 POWDER, FOR SOLUTION ORAL DAILY PRN
Status: DISCONTINUED | OUTPATIENT
Start: 2025-03-02 | End: 2025-03-07 | Stop reason: HOSPADM

## 2025-03-02 RX ORDER — ONDANSETRON 2 MG/ML
4 INJECTION INTRAMUSCULAR; INTRAVENOUS ONCE
Status: COMPLETED | OUTPATIENT
Start: 2025-03-02 | End: 2025-03-02

## 2025-03-02 RX ORDER — 0.9 % SODIUM CHLORIDE 0.9 %
600 INTRAVENOUS SOLUTION INTRAVENOUS ONCE
Status: COMPLETED | OUTPATIENT
Start: 2025-03-02 | End: 2025-03-02

## 2025-03-02 RX ORDER — SODIUM CHLORIDE 0.9 % (FLUSH) 0.9 %
5-40 SYRINGE (ML) INJECTION EVERY 12 HOURS SCHEDULED
Status: DISCONTINUED | OUTPATIENT
Start: 2025-03-02 | End: 2025-03-07 | Stop reason: HOSPADM

## 2025-03-02 RX ORDER — METOCLOPRAMIDE HYDROCHLORIDE 5 MG/ML
10 INJECTION INTRAMUSCULAR; INTRAVENOUS ONCE
Status: COMPLETED | OUTPATIENT
Start: 2025-03-02 | End: 2025-03-02

## 2025-03-02 RX ORDER — ROSUVASTATIN CALCIUM 10 MG/1
10 TABLET, COATED ORAL NIGHTLY
Status: DISCONTINUED | OUTPATIENT
Start: 2025-03-02 | End: 2025-03-07 | Stop reason: HOSPADM

## 2025-03-02 RX ORDER — SODIUM CHLORIDE 9 MG/ML
INJECTION, SOLUTION INTRAVENOUS PRN
Status: DISCONTINUED | OUTPATIENT
Start: 2025-03-02 | End: 2025-03-07 | Stop reason: HOSPADM

## 2025-03-02 RX ORDER — SODIUM CHLORIDE 9 MG/ML
INJECTION, SOLUTION INTRAVENOUS CONTINUOUS
Status: DISCONTINUED | OUTPATIENT
Start: 2025-03-02 | End: 2025-03-03

## 2025-03-02 RX ORDER — MAGNESIUM SULFATE IN WATER 40 MG/ML
2000 INJECTION, SOLUTION INTRAVENOUS PRN
Status: DISCONTINUED | OUTPATIENT
Start: 2025-03-02 | End: 2025-03-07 | Stop reason: HOSPADM

## 2025-03-02 RX ORDER — DIATRIZOATE MEGLUMINE AND DIATRIZOATE SODIUM 660; 100 MG/ML; MG/ML
30 SOLUTION ORAL; RECTAL
Status: DISCONTINUED | OUTPATIENT
Start: 2025-03-02 | End: 2025-03-02

## 2025-03-02 RX ORDER — 0.9 % SODIUM CHLORIDE 0.9 %
1000 INTRAVENOUS SOLUTION INTRAVENOUS ONCE
Status: COMPLETED | OUTPATIENT
Start: 2025-03-02 | End: 2025-03-02

## 2025-03-02 RX ORDER — VITS A,C,E/LUTEIN/MINERALS 300MCG-200
1 TABLET ORAL DAILY
Status: DISCONTINUED | OUTPATIENT
Start: 2025-03-03 | End: 2025-03-05

## 2025-03-02 RX ORDER — LOSARTAN POTASSIUM 50 MG/1
100 TABLET ORAL DAILY
Status: DISCONTINUED | OUTPATIENT
Start: 2025-03-02 | End: 2025-03-03

## 2025-03-02 RX ORDER — ACETAMINOPHEN 650 MG/1
650 SUPPOSITORY RECTAL EVERY 6 HOURS PRN
Status: DISCONTINUED | OUTPATIENT
Start: 2025-03-02 | End: 2025-03-07 | Stop reason: HOSPADM

## 2025-03-02 RX ORDER — PANTOPRAZOLE SODIUM 40 MG/1
40 TABLET, DELAYED RELEASE ORAL
Status: DISCONTINUED | OUTPATIENT
Start: 2025-03-03 | End: 2025-03-07 | Stop reason: HOSPADM

## 2025-03-02 RX ORDER — ONDANSETRON 2 MG/ML
4 INJECTION INTRAMUSCULAR; INTRAVENOUS EVERY 6 HOURS PRN
Status: DISCONTINUED | OUTPATIENT
Start: 2025-03-02 | End: 2025-03-07 | Stop reason: HOSPADM

## 2025-03-02 RX ORDER — DIATRIZOATE MEGLUMINE AND DIATRIZOATE SODIUM 660; 100 MG/ML; MG/ML
30 SOLUTION ORAL; RECTAL
Status: DISCONTINUED | OUTPATIENT
Start: 2025-03-02 | End: 2025-03-07 | Stop reason: HOSPADM

## 2025-03-02 RX ORDER — SODIUM CHLORIDE 0.9 % (FLUSH) 0.9 %
5-40 SYRINGE (ML) INJECTION PRN
Status: DISCONTINUED | OUTPATIENT
Start: 2025-03-02 | End: 2025-03-07 | Stop reason: HOSPADM

## 2025-03-02 RX ORDER — ENOXAPARIN SODIUM 100 MG/ML
30 INJECTION SUBCUTANEOUS DAILY
Status: DISCONTINUED | OUTPATIENT
Start: 2025-03-02 | End: 2025-03-02

## 2025-03-02 RX ORDER — VIT A/VIT C/VIT E/ZINC/COPPER 4296-226
1 CAPSULE ORAL DAILY
Status: DISCONTINUED | OUTPATIENT
Start: 2025-03-02 | End: 2025-03-02 | Stop reason: CLARIF

## 2025-03-02 RX ORDER — IOPAMIDOL 755 MG/ML
100 INJECTION, SOLUTION INTRAVASCULAR
Status: COMPLETED | OUTPATIENT
Start: 2025-03-02 | End: 2025-03-02

## 2025-03-02 RX ORDER — METOCLOPRAMIDE 10 MG/1
5 TABLET ORAL
Status: DISCONTINUED | OUTPATIENT
Start: 2025-03-02 | End: 2025-03-03

## 2025-03-02 RX ORDER — PANCRELIPASE 24000; 76000; 120000 [USP'U]/1; [USP'U]/1; [USP'U]/1
1 CAPSULE, DELAYED RELEASE PELLETS ORAL
COMMUNITY

## 2025-03-02 RX ORDER — POTASSIUM CHLORIDE 7.45 MG/ML
10 INJECTION INTRAVENOUS PRN
Status: DISCONTINUED | OUTPATIENT
Start: 2025-03-02 | End: 2025-03-07 | Stop reason: HOSPADM

## 2025-03-02 RX ORDER — POTASSIUM CHLORIDE 750 MG/1
40 TABLET, EXTENDED RELEASE ORAL PRN
Status: DISCONTINUED | OUTPATIENT
Start: 2025-03-02 | End: 2025-03-07 | Stop reason: HOSPADM

## 2025-03-02 RX ORDER — METOCLOPRAMIDE 10 MG/1
5 TABLET ORAL
Status: DISCONTINUED | OUTPATIENT
Start: 2025-03-02 | End: 2025-03-02

## 2025-03-02 RX ADMIN — METOCLOPRAMIDE 10 MG: 5 INJECTION, SOLUTION INTRAMUSCULAR; INTRAVENOUS at 13:01

## 2025-03-02 RX ADMIN — WATER 1000 MG: 1 INJECTION INTRAMUSCULAR; INTRAVENOUS; SUBCUTANEOUS at 03:28

## 2025-03-02 RX ADMIN — ONDANSETRON 4 MG: 2 INJECTION, SOLUTION INTRAMUSCULAR; INTRAVENOUS at 04:38

## 2025-03-02 RX ADMIN — PANCRELIPASE LIPASE, PANCRELIPASE PROTEASE, PANCRELIPASE AMYLASE 5000 UNITS: 5000; 17000; 24000 CAPSULE, DELAYED RELEASE ORAL at 16:43

## 2025-03-02 RX ADMIN — METOCLOPRAMIDE 5 MG: 10 TABLET ORAL at 18:13

## 2025-03-02 RX ADMIN — PANCRELIPASE LIPASE, PANCRELIPASE PROTEASE, PANCRELIPASE AMYLASE 20000 UNITS: 20000; 63000; 84000 CAPSULE, DELAYED RELEASE ORAL at 16:42

## 2025-03-02 RX ADMIN — IOPAMIDOL 80 ML: 755 INJECTION, SOLUTION INTRAVENOUS at 13:37

## 2025-03-02 RX ADMIN — LOSARTAN POTASSIUM 100 MG: 50 TABLET, FILM COATED ORAL at 15:50

## 2025-03-02 RX ADMIN — SODIUM CHLORIDE: 0.9 INJECTION, SOLUTION INTRAVENOUS at 07:00

## 2025-03-02 RX ADMIN — SODIUM CHLORIDE, PRESERVATIVE FREE 10 ML: 5 INJECTION INTRAVENOUS at 08:34

## 2025-03-02 RX ADMIN — SODIUM CHLORIDE: 0.9 INJECTION, SOLUTION INTRAVENOUS at 16:30

## 2025-03-02 RX ADMIN — ENOXAPARIN SODIUM 40 MG: 100 INJECTION SUBCUTANEOUS at 08:38

## 2025-03-02 RX ADMIN — SODIUM CHLORIDE 600 ML: 9 INJECTION, SOLUTION INTRAVENOUS at 03:20

## 2025-03-02 RX ADMIN — TRAZODONE HYDROCHLORIDE 50 MG: 50 TABLET ORAL at 20:06

## 2025-03-02 RX ADMIN — SODIUM CHLORIDE 1000 ML: 9 INJECTION, SOLUTION INTRAVENOUS at 03:10

## 2025-03-02 RX ADMIN — ROSUVASTATIN CALCIUM 10 MG: 10 TABLET, FILM COATED ORAL at 20:06

## 2025-03-02 ASSESSMENT — LIFESTYLE VARIABLES
HOW MANY STANDARD DRINKS CONTAINING ALCOHOL DO YOU HAVE ON A TYPICAL DAY: 1 OR 2
HOW OFTEN DO YOU HAVE A DRINK CONTAINING ALCOHOL: 4 OR MORE TIMES A WEEK

## 2025-03-02 ASSESSMENT — PAIN SCALES - GENERAL
PAINLEVEL_OUTOF10: 0

## 2025-03-02 NOTE — ED PROVIDER NOTES
[facility] - [phone]  Consulting Provider:         PATIENT REFERRED TO:  No follow-up provider specified.    DISCHARGE MEDICATIONS:  New Prescriptions    No medications on file         (Please note that portions of this note were completed with a voice recognition program.  Efforts were made to edit the dictations but occasionally words are mis-transcribed.)    Lucas Fernandez MD (electronically signed)  Emergency Attending Physician            Lucas Fernandez MD  03/02/25 5135

## 2025-03-02 NOTE — PROGRESS NOTES
[facility] Lovenox Dosing 03/02/25  Lovenox dose change per protocol    [facility] Protocol  Enoxaparin prophylaxis dosing (medically ill, surgical patients)   Patient Weight (kg)     50 and below 51 - 100 101 - 150 151 - 174 175 or greater         Estimated CrCl  (ml/min) 30 or greater   30 mg SUBQ daily   40 mg SUBQ daily (or 30 mg BID for ortho) 30 mg SUBQ BID  40 mg SUBQ BID 60mg SUBQ BID      15-29 UFH 5000 units SUBQ BID   30 mg SUBQ daily 30 mg SUBQ daily 40 mg SUBQ daily   60 mg SUBQ daily      Less than 15 or Dialysis UFH 5000 units SUBQ BID   UFH 5000 units SUBQ TID UFH 7500 units SUBQ TID     Estimated Creatinine Clearance: 37 mL/min (A) (based on SCr of 1.09 mg/dL (H)).  Current dose: 30 mg daily, changed to 40 mg daily  Recommendation:     Thank you  NATHALIE ALEJANDRE, [facility]  [phone]

## 2025-03-02 NOTE — ED NOTES
Called Ct dept to bring pt there enroute to ICU but pt will need to drink contrast over a 90 min period, so will proceed to ICU at this time

## 2025-03-02 NOTE — ED TRIAGE NOTES
Pt arrives via ems/als from home for c/o low blood sugar 21mg/dl given d10 by ems to 20g iv left forearm established en route, repeat blood sugar 240's just before entering the ER; pt is alert but in a near delirious state, repeating she is going to die right now, skin is cold to touch, hair is wet, she is redirectable, denies pain.   0315  to bedside states she fell asleep in her reclining chair about 2230 hrs last night which is normal, she wouldn't come to bed (note the chair is in the same room as him), she woke him up screaming she isnt feeling well; per history she has been losing a lot of weight over the last year, decreased appetite, ate Miso soup for dinner tonight, has had bouts of diarrhea and vomiting weekly for a long time, last emesis was yesterday;   0414 pt is and has been much more alert and talkative, calm and cooperative over the past 45 minutes, able to talk about medical history and symptoms  Pt is due to have endoscopy and colonoscopy on Tuesday this week at [place]  The reason for weight loss hasn't been discovered yet  Pt is a daily drinker 1-3 drinks a day,   Pt placed on josephine hugger on arrival after rectal temp obtained

## 2025-03-02 NOTE — PROGRESS NOTES
[facility] Dosing Services    Metoclopramide was automatically dose-adjusted per [facility] P&T Committee Protocol, with respect to renal function.      Assessment/Plan: Estimated Creatinine Clearance: 37 mL/min (A) (based on SCr of 1.09 mg/dL (H)). Metoclopramide dose reduced by 50% (reduced from 10 mg to 5 mg) per P&T approved protocol.    Creatinine Clearance Estimated Creatinine Clearance: 37 mL/min (A) (based on SCr of 1.09 mg/dL (H)).   BUN Lab Results   Component Value Date/Time    BUN 23 03/02/2025 03:16 AM         Pharmacist KEN GONZALEZ [facility]

## 2025-03-02 NOTE — PROGRESS NOTES
[facility]  [address] [place] [address] [phone]    [facility] Adult  Hospitalist Group                                                                                          Hospitalist Progress Note  Kusum Roper MD        Date of Service:  3/2/2025  NAME:  Valeria Lo  :  1950  MRN:  [id number]      Interval history / Subjective:   Patient doing much better now.  Now normothermic and blood sugars much better     Assessment & Plan:     Severe hypoglycemia  Severe hypothermia  Severe lactic acidosis  -Lactic acid now normalized  -Blood sugars improved with fluids  -Off Mingo hugger    Unintentional weight loss  Chronic nausea  -CT chest abdomen pelvis with IV and oral contrast pending  -GI evaluated, awaiting CT results for further recommendations  -She had outpatient colonoscopy scheduled for 3/4 at [facility]  -She was started on Creon for presumed pancreatic insufficiency by her GI doctor  -she had a previous abnormal gastric emptying study, will start scheduled reglan    Hypertension  Hyperlipidemia  -Resume losartan, Crestor    Hypokalemia  -Repleted per protocol    Outisde Records, prior notes, labs, radiology, and medications reviewed     Code status: Full code  DVT prophylaxis: [facility] Problems             Last Modified POA    * (Principal) Lactic acidosis 3/2/2025 Yes          Review of Systems:   Pertinent items are noted in HPI.       Vital Signs:    Last 24hrs VS reviewed since prior progress note. Most recent are:  Vitals:    25 1430   BP: (!) 150/77   Pulse: 78   Resp: 15   Temp:    SpO2: 100%         Intake/Output Summary (Last 24 hours) at 3/2/2025 1444  Last data filed at 3/2/2025 0433  Gross per 24 hour   Intake 1600 ml   Output 800 ml   Net 800 ml        Physical Examination:             Constitutional:  No acute distress, cooperative, pleasant    ENT:  Oral mucosa moist, oropharynx benign.

## 2025-03-02 NOTE — ED NOTES
TRANSFER - OUT REPORT:    Verbal report given to CON Marie on Valeria JORGE [facility]  being transferred to [facility] for routine progression of patient care       Report consisted of patient's Situation, Background, Assessment and   Recommendations(SBAR).     Information from the following report(s) Nurse Handoff Report, ED Encounter Summary, ED SBAR, Adult Overview, Intake/Output, MAR, Recent Results, Med Rec Status, Cardiac Rhythm nsr, and Neuro Assessment was reviewed with the receiving nurse.    [place] Fall Assessment:    Presents to emergency department  because of falls (Syncope, seizure, or loss of consciousness): No  Age > 70: Yes  Altered Mental Status, Intoxication with alcohol or substance confusion (Disorientation, impaired judgment, poor safety awaremess, or inability to follow instructions): Yes  Impaired Mobility: Ambulates or transfers with assistive devices or assistance; Unable to ambulate or transer.: No  Nursing Judgement: Yes          Lines:   Peripheral IV 03/02/25 Left;Posterior Forearm (Active)       Peripheral IV 03/02/25 Right;Ventral Forearm (Active)        Opportunity for questions and clarification was provided.      Patient transported with:  Monitor and Registered Nurse

## 2025-03-02 NOTE — CONSULTS
Raymond Martines MD      [phone]   Gastroenterology Consultation Note      Admit Date: 3/2/2025  Consult Date: 3/2/2025   I appreciate your asking me [facility], thank you for the opportunity to participate in her care.    Narrative Assessment and Plan   Severe hypoglycemia of unclear etiology. Pt is not a diabetic  Severe lactic acidosis, unclear etiology. Improving  Hypothermia- improved  Nausea, vomiting, diarrhea, weight loss- chronic with some acute worsening  Loss of appetite    Await CT results  Dr Suero will follow the patient in the hospital.   Not stable for GI scopes at this time      Subjective:     Chief Complaint: AMS, hypoglycemia    History of Present Illness:   75 y/o woman with PMH as mentioned, who follows with GI Dr Cruz. Pt was brought to ER by EMS. Pt felt very anxious and jittery and  called EMS. BS was low at 29, corrected with D10. Also hypothermic on admission and had lactic acidosis 8.9. Found to be delerious on presentation. Being managed in ICU was presumed sepsis. Got Ceftriaxone  today.   Just had CT A/P- results pending.     Pt has had nausea, vomiting, unexplained wt loss and was scheduled for EGD/[place] on 3/4 as OP with Dr cruz.      She drinks alcohol daily and vapes marijuana to help chr back pain from lumbar spondylosis.     Currently being monitored in ICU. Nausea has improved but appetite is poor.       PCP:  Beatris Vick MD    Past Medical History:   Diagnosis Date    Arthritis     osteoperosis    Chronic back pain     Chronic pancreatitis (HCC)     Hypertension     Psychiatric disorder     anxiety        Past Surgical History:   Procedure Laterality Date    GYN      Tubal ligation    HYSTERECTOMY (CERVIX STATUS UNKNOWN)      ORTHOPEDIC SURGERY  2013    right heel fx repair       Social History     Tobacco Use    Smoking status: Former     Current packs/day: 0.00     Types: Cigarettes     Quit date: 8/8/2015  MG/DL   CK    Collection Time: 03/02/25  8:01 AM   Result Value Ref Range    Total CK 57 26 - 192 U/L   Lactic Acid    Collection Time: 03/02/25 10:44 AM   Result Value Ref Range    Lactic Acid, Plasma 0.6 0.4 - 2.0 MMOL/L   POCT Glucose    Collection Time: 03/02/25 12:00 PM   Result Value Ref Range    POC Glucose 78 65 - 117 mg/dL    Performed by: Victor Manuel Cisneros RN          Assessment/Plan:     Principal Problem:    Lactic acidosis  Resolved Problems:    * No resolved hospital problems. *       See above narrative for full detail.

## 2025-03-02 NOTE — H&P
Collection Time: 03/02/25  3:16 AM   Result Value Ref Range    WBC 9.6 3.6 - 11.0 K/uL    RBC 4.11 3.80 - 5.20 M/uL    Hemoglobin 12.5 11.5 - 16.0 g/dL    Hematocrit 38.6 35.0 - 47.0 %    MCV 93.9 80.0 - 99.0 FL    MCH 30.4 26.0 - 34.0 PG    MCHC 32.4 30.0 - 36.5 g/dL    RDW 13.2 11.5 - 14.5 %    Platelets 252 150 - 400 K/uL    MPV 9.7 8.9 - 12.9 FL    Nucleated RBCs 0.0 0  WBC    nRBC 0.00 0.00 - 0.01 K/uL    Neutrophils % 61.9 32.0 - 75.0 %    Lymphocytes % 32.1 12.0 - 49.0 %    Monocytes % 3.5 (L) 5.0 - 13.0 %    Eosinophils % 0.9 0.0 - 7.0 %    Basophils % 0.6 0.0 - 1.0 %    Immature Granulocytes % 1.0 (H) 0.0 - 0.5 %    Neutrophils Absolute 5.95 1.80 - 8.00 K/UL    Lymphocytes Absolute 3.09 0.80 - 3.50 K/UL    Monocytes Absolute 0.34 0.00 - 1.00 K/UL    Eosinophils Absolute 0.09 0.00 - 0.40 K/UL    Basophils Absolute 0.06 0.00 - 0.10 K/UL    Immature Granulocytes Absolute 0.10 (H) 0.00 - 0.04 K/UL    Differential Type AUTOMATED     Comprehensive Metabolic Panel    Collection Time: 03/02/25  3:16 AM   Result Value Ref Range    Sodium 137 136 - 145 mmol/L    Potassium 3.1 (L) 3.5 - 5.1 mmol/L    Chloride 102 97 - 108 mmol/L    CO2 13 (LL) 21 - 32 mmol/L    Anion Gap 22 (H) 2 - 12 mmol/L    Glucose 227 (H) 65 - 100 mg/dL    BUN 23 (H) 6 - 20 MG/DL    Creatinine 1.09 (H) 0.55 - 1.02 MG/DL    BUN/Creatinine Ratio 21 (H) 12 - 20      Est, Glom Filt Rate 54 (L) >60 ml/min/1.73m2    Calcium 9.8 8.5 - 10.1 MG/DL    Total Bilirubin 0.9 0.2 - 1.0 MG/DL    ALT 25 12 - 78 U/L    AST 38 (H) 15 - 37 U/L    Alk Phosphatase 71 45 - 117 U/L    Total Protein 7.7 6.4 - 8.2 g/dL    Albumin 4.3 3.5 - 5.0 g/dL    Globulin 3.4 2.0 - 4.0 g/dL    Albumin/Globulin Ratio 1.3 1.1 - 2.2     Procalcitonin    Collection Time: 03/02/25  3:16 AM   Result Value Ref Range    Procalcitonin <0.05 ng/mL   Troponin    Collection Time: 03/02/25  3:16 AM   Result Value Ref Range    Troponin, High Sensitivity 11 0 - 51 ng/L   TSH    Collection  overlie the thorax. The cardiac silhouette is  within normal limits. The pulmonary vasculature is within normal limits.    Emphysema and mild chronic interstitial lung disease are slightly increased. No  focal airspace opacity. No pneumothorax. Bones are unchanged.    Impression  Increased emphysema and chronic interstitial lung disease. No evidence of  pneumonia on this portable chest view.      Electronically signed by Sunny Lambert      ________________________________________________________________________  Signed: Narcisa Morgan MD        Procedures: see electronic medical records for all procedures/Xrays/labs and details which were not copied into this note but were reviewed prior to creation of Plan.

## 2025-03-02 NOTE — ED NOTES
Verbal report given to CON Marie(name) on Valeria JORGE [facility] being transferred to [facility](unit) for routine progression of patient care    Report consisted of patient's Situation, Background, Assessment and Recommendations (SBAR)    Information from the following report(s)  Nurse Handoff Report, ED Encounter Summary, ED SBAR, Adult Overview, Intake/Output, MAR, Recent Results, Med Rec Status, Cardiac Rhythm nsr, and Neuro Assessment was reviewed with the receiving nurse.    Opportunity for questions and clarification was provided.    Patient transported with:  Monitor and Registered Nurse    Last Filed Values:  Vitals:    03/02/25 0500   BP: 132/70   Pulse: 60   Resp: 11   Temp:    SpO2: 98%          WBC   Date Value Ref Range Status   03/02/2025 9.6 3.6 - 11.0 K/uL Final   08/14/2024 8.7 3.6 - 11.0 K/uL Final        Blood Cultures Drawn:  Yes    Initial Lactic Acid (LA):  Time 0315,  Result 8.89    Repeat LA:  Time Due 0450, Done & Result 7.83    Fluid Restriction:  Total needed 1600, Status completed, amount 1600    All Antibiotics Started:  Yes, Dose Due n/a    VS x 2 post-fluid resuscitation:   Yes    Vasopressor Infusion:  No  n/a    Provider Reassessment needed and notified:  Yes, Due n/a    Additional Interventions/Comments:

## 2025-03-03 ENCOUNTER — APPOINTMENT (OUTPATIENT)
Facility: HOSPITAL | Age: 75
DRG: 242 | End: 2025-03-03
Attending: INTERNAL MEDICINE
Payer: MEDICARE

## 2025-03-03 ENCOUNTER — APPOINTMENT (OUTPATIENT)
Facility: HOSPITAL | Age: 75
DRG: 242 | End: 2025-03-03
Payer: MEDICARE

## 2025-03-03 ENCOUNTER — ANESTHESIA (OUTPATIENT)
Facility: HOSPITAL | Age: 75
DRG: 243 | End: 2025-03-03
Payer: MEDICARE

## 2025-03-03 ENCOUNTER — ANESTHESIA EVENT (OUTPATIENT)
Facility: HOSPITAL | Age: 75
DRG: 243 | End: 2025-03-03
Payer: MEDICARE

## 2025-03-03 PROBLEM — R55 VASOVAGAL SYNCOPE: Status: ACTIVE | Noted: 2025-03-03

## 2025-03-03 PROBLEM — I45.5 SINUS PAUSE: Status: ACTIVE | Noted: 2025-03-03

## 2025-03-03 LAB
ALBUMIN SERPL-MCNC: 3 G/DL (ref 3.5–5)
ALBUMIN/GLOB SERPL: 1 (ref 1.1–2.2)
ALP SERPL-CCNC: 56 U/L (ref 45–117)
ALT SERPL-CCNC: 21 U/L (ref 12–78)
ANION GAP SERPL CALC-SCNC: 8 MMOL/L (ref 2–12)
AST SERPL-CCNC: 34 U/L (ref 15–37)
BASOPHILS # BLD: 0.02 K/UL (ref 0–0.1)
BASOPHILS NFR BLD: 0.3 % (ref 0–1)
BILIRUB SERPL-MCNC: 0.8 MG/DL (ref 0.2–1)
BUN SERPL-MCNC: 11 MG/DL (ref 6–20)
BUN/CREAT SERPL: 14 (ref 12–20)
CALCIUM SERPL-MCNC: 8.3 MG/DL (ref 8.5–10.1)
CHLORIDE SERPL-SCNC: 115 MMOL/L (ref 97–108)
CO2 SERPL-SCNC: 19 MMOL/L (ref 21–32)
COMMENT:: NORMAL
CREAT SERPL-MCNC: 0.78 MG/DL (ref 0.55–1.02)
DIFFERENTIAL METHOD BLD: ABNORMAL
ECHO AO ASC DIAM: 3.8 CM
ECHO AO ASCENDING AORTA INDEX: 2.45 CM/M2
ECHO AO ROOT DIAM: 3.6 CM
ECHO AO ROOT INDEX: 2.32 CM/M2
ECHO AV PEAK GRADIENT: 58 MMHG
ECHO AV PEAK VELOCITY: 3.8 M/S
ECHO BSA: 1.53 M2
ECHO BSA: 1.53 M2
ECHO LA DIAMETER INDEX: 1.74 CM/M2
ECHO LA DIAMETER: 2.7 CM
ECHO LA TO AORTIC ROOT RATIO: 0.75
ECHO LA VOL A-L A2C: 39 ML (ref 22–52)
ECHO LA VOL A-L A4C: 50 ML (ref 22–52)
ECHO LA VOL BP: 42 ML (ref 22–52)
ECHO LA VOL MOD A2C: 38 ML (ref 22–52)
ECHO LA VOL MOD A4C: 45 ML (ref 22–52)
ECHO LA VOL/BSA BIPLANE: 27 ML/M2 (ref 16–34)
ECHO LA VOLUME AREA LENGTH: 46 ML
ECHO LA VOLUME INDEX A-L A2C: 25 ML/M2 (ref 16–34)
ECHO LA VOLUME INDEX A-L A4C: 32 ML/M2 (ref 16–34)
ECHO LA VOLUME INDEX AREA LENGTH: 30 ML/M2 (ref 16–34)
ECHO LA VOLUME INDEX MOD A2C: 25 ML/M2 (ref 16–34)
ECHO LA VOLUME INDEX MOD A4C: 29 ML/M2 (ref 16–34)
ECHO LV E' LATERAL VELOCITY: 9.16 CM/S
ECHO LV EDV A2C: 72 ML
ECHO LV EDV A4C: 52 ML
ECHO LV EDV BP: 67 ML (ref 56–104)
ECHO LV EDV INDEX A4C: 34 ML/M2
ECHO LV EDV INDEX BP: 43 ML/M2
ECHO LV EDV NDEX A2C: 46 ML/M2
ECHO LV EF PHYSICIAN: 75 %
ECHO LV EJECTION FRACTION A2C: 81 %
ECHO LV EJECTION FRACTION A4C: 63 %
ECHO LV EJECTION FRACTION BIPLANE: 75 % (ref 55–100)
ECHO LV ESV A2C: 13 ML
ECHO LV ESV A4C: 19 ML
ECHO LV ESV BP: 17 ML (ref 19–49)
ECHO LV ESV INDEX A2C: 8 ML/M2
ECHO LV ESV INDEX A4C: 12 ML/M2
ECHO LV ESV INDEX BP: 11 ML/M2
ECHO LV FRACTIONAL SHORTENING: 56 % (ref 28–44)
ECHO LV INTERNAL DIMENSION DIASTOLE INDEX: 2.77 CM/M2
ECHO LV INTERNAL DIMENSION DIASTOLIC: 4.3 CM (ref 3.9–5.3)
ECHO LV INTERNAL DIMENSION SYSTOLIC INDEX: 1.23 CM/M2
ECHO LV INTERNAL DIMENSION SYSTOLIC: 1.9 CM
ECHO LV IVSD: 1.3 CM (ref 0.6–0.9)
ECHO LV MASS 2D: 219.8 G (ref 67–162)
ECHO LV MASS INDEX 2D: 141.8 G/M2 (ref 43–95)
ECHO LV POSTERIOR WALL DIASTOLIC: 1.4 CM (ref 0.6–0.9)
ECHO LV RELATIVE WALL THICKNESS RATIO: 0.65
ECHO LVOT AREA: 2.5 CM2
ECHO LVOT DIAM: 1.8 CM
ECHO PV MAX VELOCITY: 2.1 M/S
ECHO PV PEAK GRADIENT: 17 MMHG
ECHO RV FREE WALL PEAK S': 29.3 CM/S
ECHO RV TAPSE: 3.1 CM (ref 1.7–?)
ECHO TV REGURGITANT MAX VELOCITY: 3.7 M/S
ECHO TV REGURGITANT PEAK GRADIENT: 55 MMHG
EKG ATRIAL RATE: 58 BPM
EKG DIAGNOSIS: NORMAL
EKG P AXIS: 66 DEGREES
EKG P-R INTERVAL: 176 MS
EKG Q-T INTERVAL: 468 MS
EKG QRS DURATION: 90 MS
EKG QTC CALCULATION (BAZETT): 459 MS
EKG R AXIS: 64 DEGREES
EKG T AXIS: 54 DEGREES
EKG VENTRICULAR RATE: 58 BPM
EOSINOPHIL # BLD: 0.06 K/UL (ref 0–0.4)
EOSINOPHIL NFR BLD: 0.9 % (ref 0–7)
ERYTHROCYTE [DISTWIDTH] IN BLOOD BY AUTOMATED COUNT: 13.7 % (ref 11.5–14.5)
GLOBULIN SER CALC-MCNC: 3 G/DL (ref 2–4)
GLUCOSE BLD STRIP.AUTO-MCNC: 124 MG/DL (ref 65–117)
GLUCOSE BLD STRIP.AUTO-MCNC: 142 MG/DL (ref 65–117)
GLUCOSE BLD STRIP.AUTO-MCNC: 214 MG/DL (ref 65–117)
GLUCOSE BLD STRIP.AUTO-MCNC: 93 MG/DL (ref 65–117)
GLUCOSE SERPL-MCNC: 98 MG/DL (ref 65–100)
HCT VFR BLD AUTO: 29.2 % (ref 35–47)
HGB BLD-MCNC: 9.6 G/DL (ref 11.5–16)
IMM GRANULOCYTES # BLD AUTO: 0.01 K/UL (ref 0–0.04)
IMM GRANULOCYTES NFR BLD AUTO: 0.2 % (ref 0–0.5)
LACTATE SERPL-SCNC: 1 MMOL/L (ref 0.4–2)
LYMPHOCYTES # BLD: 2.18 K/UL (ref 0.8–3.5)
LYMPHOCYTES NFR BLD: 34.2 % (ref 12–49)
MAGNESIUM SERPL-MCNC: 2.2 MG/DL (ref 1.6–2.4)
MCH RBC QN AUTO: 30.8 PG (ref 26–34)
MCHC RBC AUTO-ENTMCNC: 32.9 G/DL (ref 30–36.5)
MCV RBC AUTO: 93.6 FL (ref 80–99)
MONOCYTES # BLD: 0.54 K/UL (ref 0–1)
MONOCYTES NFR BLD: 8.5 % (ref 5–13)
NEUTS SEG # BLD: 3.57 K/UL (ref 1.8–8)
NEUTS SEG NFR BLD: 55.9 % (ref 32–75)
NRBC # BLD: 0 K/UL (ref 0–0.01)
NRBC BLD-RTO: 0 PER 100 WBC
PHOSPHATE SERPL-MCNC: 1.9 MG/DL (ref 2.6–4.7)
PHOSPHATE SERPL-MCNC: 1.9 MG/DL (ref 2.6–4.7)
PLATELET # BLD AUTO: 219 K/UL (ref 150–400)
PMV BLD AUTO: 9.7 FL (ref 8.9–12.9)
POTASSIUM SERPL-SCNC: 4.1 MMOL/L (ref 3.5–5.1)
PROT SERPL-MCNC: 6 G/DL (ref 6.4–8.2)
RBC # BLD AUTO: 3.12 M/UL (ref 3.8–5.2)
SERVICE CMNT-IMP: ABNORMAL
SERVICE CMNT-IMP: NORMAL
SODIUM SERPL-SCNC: 142 MMOL/L (ref 136–145)
SPECIMEN HOLD: NORMAL
TROPONIN I SERPL HS-MCNC: 22 NG/L (ref 0–51)
TROPONIN I SERPL HS-MCNC: 22 NG/L (ref 0–51)
WBC # BLD AUTO: 6.4 K/UL (ref 3.6–11)

## 2025-03-03 PROCEDURE — 3700000000 HC ANESTHESIA ATTENDED CARE: Performed by: INTERNAL MEDICINE

## 2025-03-03 PROCEDURE — 76937 US GUIDE VASCULAR ACCESS: CPT | Performed by: INTERNAL MEDICINE

## 2025-03-03 PROCEDURE — 71045 X-RAY EXAM CHEST 1 VIEW: CPT

## 2025-03-03 PROCEDURE — 83735 ASSAY OF MAGNESIUM: CPT

## 2025-03-03 PROCEDURE — 99223 1ST HOSP IP/OBS HIGH 75: CPT | Performed by: INTERNAL MEDICINE

## 2025-03-03 PROCEDURE — 36415 COLL VENOUS BLD VENIPUNCTURE: CPT

## 2025-03-03 PROCEDURE — 2580000003 HC RX 258: Performed by: HOSPITALIST

## 2025-03-03 PROCEDURE — 33208 INSRT HEART PM ATRIAL & VENT: CPT | Performed by: INTERNAL MEDICINE

## 2025-03-03 PROCEDURE — 6360000002 HC RX W HCPCS: Performed by: NURSE ANESTHETIST, CERTIFIED REGISTERED

## 2025-03-03 PROCEDURE — 93010 ELECTROCARDIOGRAM REPORT: CPT | Performed by: SPECIALIST

## 2025-03-03 PROCEDURE — 6370000000 HC RX 637 (ALT 250 FOR IP): Performed by: HOSPITALIST

## 2025-03-03 PROCEDURE — 6360000002 HC RX W HCPCS: Performed by: INTERNAL MEDICINE

## 2025-03-03 PROCEDURE — C1769 GUIDE WIRE: HCPCS | Performed by: INTERNAL MEDICINE

## 2025-03-03 PROCEDURE — 5A12012 PERFORMANCE OF CARDIAC OUTPUT, SINGLE, MANUAL: ICD-10-PCS | Performed by: HOSPITALIST

## 2025-03-03 PROCEDURE — 6370000000 HC RX 637 (ALT 250 FOR IP): Performed by: FAMILY MEDICINE

## 2025-03-03 PROCEDURE — 0JH606Z INSERTION OF PACEMAKER, DUAL CHAMBER INTO CHEST SUBCUTANEOUS TISSUE AND FASCIA, OPEN APPROACH: ICD-10-PCS | Performed by: INTERNAL MEDICINE

## 2025-03-03 PROCEDURE — 02HK3JZ INSERTION OF PACEMAKER LEAD INTO RIGHT VENTRICLE, PERCUTANEOUS APPROACH: ICD-10-PCS | Performed by: INTERNAL MEDICINE

## 2025-03-03 PROCEDURE — 85025 COMPLETE CBC W/AUTO DIFF WBC: CPT

## 2025-03-03 PROCEDURE — 83605 ASSAY OF LACTIC ACID: CPT

## 2025-03-03 PROCEDURE — 2500000003 HC RX 250 WO HCPCS: Performed by: HOSPITALIST

## 2025-03-03 PROCEDURE — 96375 TX/PRO/DX INJ NEW DRUG ADDON: CPT

## 2025-03-03 PROCEDURE — 6360000002 HC RX W HCPCS: Performed by: HOSPITALIST

## 2025-03-03 PROCEDURE — 94761 N-INVAS EAR/PLS OXIMETRY MLT: CPT

## 2025-03-03 PROCEDURE — 2500000003 HC RX 250 WO HCPCS: Performed by: INTERNAL MEDICINE

## 2025-03-03 PROCEDURE — 93306 TTE W/DOPPLER COMPLETE: CPT | Performed by: STUDENT IN AN ORGANIZED HEALTH CARE EDUCATION/TRAINING PROGRAM

## 2025-03-03 PROCEDURE — 2000000000 HC ICU R&B

## 2025-03-03 PROCEDURE — 80053 COMPREHEN METABOLIC PANEL: CPT

## 2025-03-03 PROCEDURE — 82962 GLUCOSE BLOOD TEST: CPT

## 2025-03-03 PROCEDURE — 02H63JZ INSERTION OF PACEMAKER LEAD INTO RIGHT ATRIUM, PERCUTANEOUS APPROACH: ICD-10-PCS | Performed by: INTERNAL MEDICINE

## 2025-03-03 PROCEDURE — C1892 INTRO/SHEATH,FIXED,PEEL-AWAY: HCPCS | Performed by: INTERNAL MEDICINE

## 2025-03-03 PROCEDURE — 84100 ASSAY OF PHOSPHORUS: CPT

## 2025-03-03 PROCEDURE — C1785 PMKR, DUAL, RATE-RESP: HCPCS | Performed by: INTERNAL MEDICINE

## 2025-03-03 PROCEDURE — 2709999900 HC NON-CHARGEABLE SUPPLY: Performed by: INTERNAL MEDICINE

## 2025-03-03 PROCEDURE — 95706 EEG WO VID 2-12HR INTMT MNTR: CPT

## 2025-03-03 PROCEDURE — 93306 TTE W/DOPPLER COMPLETE: CPT

## 2025-03-03 PROCEDURE — 84484 ASSAY OF TROPONIN QUANT: CPT

## 2025-03-03 PROCEDURE — C1894 INTRO/SHEATH, NON-LASER: HCPCS | Performed by: INTERNAL MEDICINE

## 2025-03-03 PROCEDURE — 3700000001 HC ADD 15 MINUTES (ANESTHESIA): Performed by: INTERNAL MEDICINE

## 2025-03-03 PROCEDURE — C1898 LEAD, PMKR, OTHER THAN TRANS: HCPCS | Performed by: INTERNAL MEDICINE

## 2025-03-03 DEVICE — IPG W1DR01 AZURE XT DR MRI USA
Type: IMPLANTABLE DEVICE | Status: FUNCTIONAL
Brand: AZURE™ XT DR MRI SURESCAN™

## 2025-03-03 DEVICE — LEAD 5076-58 MRI US RCMCRD
Type: IMPLANTABLE DEVICE | Status: FUNCTIONAL
Brand: CAPSUREFIX NOVUS MRI™ SURESCAN®

## 2025-03-03 DEVICE — LEAD 5076-52 MRI US RCMCRD
Type: IMPLANTABLE DEVICE | Status: FUNCTIONAL
Brand: CAPSUREFIX NOVUS MRI™ SURESCAN®

## 2025-03-03 RX ORDER — TRAMADOL HYDROCHLORIDE 50 MG/1
50 TABLET ORAL EVERY 8 HOURS
Status: COMPLETED | OUTPATIENT
Start: 2025-03-03 | End: 2025-03-05

## 2025-03-03 RX ORDER — PROPOFOL 10 MG/ML
INJECTION, EMULSION INTRAVENOUS
Status: DISCONTINUED | OUTPATIENT
Start: 2025-03-03 | End: 2025-03-03 | Stop reason: SDUPTHER

## 2025-03-03 RX ORDER — MIDAZOLAM HYDROCHLORIDE 1 MG/ML
INJECTION, SOLUTION INTRAMUSCULAR; INTRAVENOUS
Status: DISCONTINUED | OUTPATIENT
Start: 2025-03-03 | End: 2025-03-03 | Stop reason: SDUPTHER

## 2025-03-03 RX ORDER — ATROPINE SULFATE 0.1 MG/ML
0.5 INJECTION INTRAVENOUS ONCE
Status: COMPLETED | OUTPATIENT
Start: 2025-03-03 | End: 2025-03-03

## 2025-03-03 RX ORDER — BUPIVACAINE HYDROCHLORIDE 2.5 MG/ML
INJECTION, SOLUTION EPIDURAL; INFILTRATION; INTRACAUDAL PRN
Status: DISCONTINUED | OUTPATIENT
Start: 2025-03-03 | End: 2025-03-03 | Stop reason: HOSPADM

## 2025-03-03 RX ORDER — ENOXAPARIN SODIUM 100 MG/ML
40 INJECTION SUBCUTANEOUS NIGHTLY
Status: DISCONTINUED | OUTPATIENT
Start: 2025-03-03 | End: 2025-03-07 | Stop reason: HOSPADM

## 2025-03-03 RX ORDER — DOPAMINE HYDROCHLORIDE 160 MG/100ML
1-20 INJECTION, SOLUTION INTRAVENOUS CONTINUOUS
Status: DISCONTINUED | OUTPATIENT
Start: 2025-03-03 | End: 2025-03-03

## 2025-03-03 RX ORDER — OXYCODONE HYDROCHLORIDE 5 MG/1
5 TABLET ORAL EVERY 6 HOURS PRN
Status: DISPENSED | OUTPATIENT
Start: 2025-03-03 | End: 2025-03-05

## 2025-03-03 RX ORDER — HYDROXYZINE HYDROCHLORIDE 10 MG/1
10 TABLET, FILM COATED ORAL 3 TIMES DAILY PRN
Status: DISCONTINUED | OUTPATIENT
Start: 2025-03-03 | End: 2025-03-07 | Stop reason: HOSPADM

## 2025-03-03 RX ORDER — FENTANYL CITRATE 50 UG/ML
INJECTION, SOLUTION INTRAMUSCULAR; INTRAVENOUS
Status: DISCONTINUED | OUTPATIENT
Start: 2025-03-03 | End: 2025-03-03 | Stop reason: SDUPTHER

## 2025-03-03 RX ORDER — CEFAZOLIN SODIUM 1 G/3ML
INJECTION, POWDER, FOR SOLUTION INTRAMUSCULAR; INTRAVENOUS
Status: DISCONTINUED | OUTPATIENT
Start: 2025-03-03 | End: 2025-03-03 | Stop reason: SDUPTHER

## 2025-03-03 RX ORDER — LIDOCAINE 4 G/G
1 PATCH TOPICAL DAILY
Status: DISCONTINUED | OUTPATIENT
Start: 2025-03-03 | End: 2025-03-07 | Stop reason: HOSPADM

## 2025-03-03 RX ADMIN — TRAZODONE HYDROCHLORIDE 50 MG: 50 TABLET ORAL at 19:54

## 2025-03-03 RX ADMIN — ACETAMINOPHEN 650 MG: 325 TABLET ORAL at 05:23

## 2025-03-03 RX ADMIN — PANCRELIPASE LIPASE, PANCRELIPASE PROTEASE, PANCRELIPASE AMYLASE 5000 UNITS: 5000; 17000; 24000 CAPSULE, DELAYED RELEASE ORAL at 12:58

## 2025-03-03 RX ADMIN — TRAMADOL HYDROCHLORIDE 50 MG: 50 TABLET, COATED ORAL at 19:54

## 2025-03-03 RX ADMIN — OXYCODONE 5 MG: 5 TABLET ORAL at 17:21

## 2025-03-03 RX ADMIN — FENTANYL CITRATE 25 MCG: 50 INJECTION, SOLUTION INTRAMUSCULAR; INTRAVENOUS at 10:43

## 2025-03-03 RX ADMIN — SODIUM PHOSPHATE, MONOBASIC, MONOHYDRATE AND SODIUM PHOSPHATE, DIBASIC, ANHYDROUS 15 MMOL: 276; 142 INJECTION, SOLUTION INTRAVENOUS at 12:36

## 2025-03-03 RX ADMIN — FENTANYL CITRATE 25 MCG: 50 INJECTION, SOLUTION INTRAMUSCULAR; INTRAVENOUS at 10:46

## 2025-03-03 RX ADMIN — MIDAZOLAM HYDROCHLORIDE 0.5 MG: 1 INJECTION, SOLUTION INTRAMUSCULAR; INTRAVENOUS at 09:58

## 2025-03-03 RX ADMIN — PROPOFOL 10 MG: 10 INJECTION, EMULSION INTRAVENOUS at 10:20

## 2025-03-03 RX ADMIN — ISOPROTERENOL HYDROCHLORIDE 8 MCG/MIN: 0.2 INJECTION, SOLUTION INTRAVENOUS at 09:00

## 2025-03-03 RX ADMIN — FENTANYL CITRATE 25 MCG: 50 INJECTION, SOLUTION INTRAMUSCULAR; INTRAVENOUS at 09:58

## 2025-03-03 RX ADMIN — PANCRELIPASE LIPASE, PANCRELIPASE PROTEASE, PANCRELIPASE AMYLASE 20000 UNITS: 20000; 63000; 84000 CAPSULE, DELAYED RELEASE ORAL at 16:47

## 2025-03-03 RX ADMIN — ENOXAPARIN SODIUM 40 MG: 100 INJECTION SUBCUTANEOUS at 19:54

## 2025-03-03 RX ADMIN — TRAMADOL HYDROCHLORIDE 50 MG: 50 TABLET, COATED ORAL at 12:55

## 2025-03-03 RX ADMIN — PROPOFOL 10 MG: 10 INJECTION, EMULSION INTRAVENOUS at 10:01

## 2025-03-03 RX ADMIN — PANCRELIPASE LIPASE, PANCRELIPASE PROTEASE, PANCRELIPASE AMYLASE 5000 UNITS: 5000; 17000; 24000 CAPSULE, DELAYED RELEASE ORAL at 16:48

## 2025-03-03 RX ADMIN — ATROPINE SULFATE 0.5 MG: 0.1 INJECTION, SOLUTION INTRAVENOUS at 08:23

## 2025-03-03 RX ADMIN — PANTOPRAZOLE SODIUM 40 MG: 40 TABLET, DELAYED RELEASE ORAL at 05:23

## 2025-03-03 RX ADMIN — FENTANYL CITRATE 25 MCG: 50 INJECTION, SOLUTION INTRAMUSCULAR; INTRAVENOUS at 10:19

## 2025-03-03 RX ADMIN — PROPOFOL 25 MCG/KG/MIN: 10 INJECTION, EMULSION INTRAVENOUS at 10:00

## 2025-03-03 RX ADMIN — ISOPROTERENOL HYDROCHLORIDE 1 MCG/MIN: 0.2 INJECTION, SOLUTION INTRAVENOUS at 05:25

## 2025-03-03 RX ADMIN — MIDAZOLAM HYDROCHLORIDE 0.5 MG: 1 INJECTION, SOLUTION INTRAMUSCULAR; INTRAVENOUS at 10:15

## 2025-03-03 RX ADMIN — ONDANSETRON 4 MG: 2 INJECTION, SOLUTION INTRAMUSCULAR; INTRAVENOUS at 14:25

## 2025-03-03 RX ADMIN — MIDAZOLAM HYDROCHLORIDE 0.5 MG: 1 INJECTION, SOLUTION INTRAMUSCULAR; INTRAVENOUS at 09:54

## 2025-03-03 RX ADMIN — PROPOFOL 10 MG: 10 INJECTION, EMULSION INTRAVENOUS at 10:22

## 2025-03-03 RX ADMIN — ACETAMINOPHEN 650 MG: 325 TABLET ORAL at 16:48

## 2025-03-03 RX ADMIN — ROSUVASTATIN CALCIUM 10 MG: 10 TABLET, FILM COATED ORAL at 19:54

## 2025-03-03 RX ADMIN — PANCRELIPASE LIPASE, PANCRELIPASE PROTEASE, PANCRELIPASE AMYLASE 20000 UNITS: 20000; 63000; 84000 CAPSULE, DELAYED RELEASE ORAL at 12:55

## 2025-03-03 RX ADMIN — CEFAZOLIN SODIUM 2 G: 1 POWDER, FOR SOLUTION INTRAMUSCULAR; INTRAVENOUS at 10:01

## 2025-03-03 RX ADMIN — MIDAZOLAM HYDROCHLORIDE 0.5 MG: 1 INJECTION, SOLUTION INTRAMUSCULAR; INTRAVENOUS at 10:05

## 2025-03-03 ASSESSMENT — PAIN DESCRIPTION - LOCATION
LOCATION: CHEST

## 2025-03-03 ASSESSMENT — PAIN DESCRIPTION - ORIENTATION
ORIENTATION: MID

## 2025-03-03 ASSESSMENT — PAIN SCALES - GENERAL
PAINLEVEL_OUTOF10: 7
PAINLEVEL_OUTOF10: 6
PAINLEVEL_OUTOF10: 5
PAINLEVEL_OUTOF10: 6

## 2025-03-03 ASSESSMENT — PAIN DESCRIPTION - PAIN TYPE
TYPE: ACUTE PAIN
TYPE: ACUTE PAIN;OTHER (COMMENT)
TYPE: ACUTE PAIN

## 2025-03-03 ASSESSMENT — PAIN - FUNCTIONAL ASSESSMENT
PAIN_FUNCTIONAL_ASSESSMENT: PREVENTS OR INTERFERES WITH ALL ACTIVE AND SOME PASSIVE ACTIVITIES
PAIN_FUNCTIONAL_ASSESSMENT: PREVENTS OR INTERFERES SOME ACTIVE ACTIVITIES AND ADLS
PAIN_FUNCTIONAL_ASSESSMENT: PREVENTS OR INTERFERES SOME ACTIVE ACTIVITIES AND ADLS

## 2025-03-03 ASSESSMENT — PAIN DESCRIPTION - DESCRIPTORS
DESCRIPTORS: ACHING;SORE
DESCRIPTORS: ACHING
DESCRIPTORS: ACHING

## 2025-03-03 NOTE — PROGRESS NOTES
0342- Nursing at bedside collecting AM labs. Pt states she feels very lightheaded. Pt appears fearful, pt grabbing at nursing staff. Pt unresponsive, asystole on monitor. Pulseless. Compressions started, Code BLUE called.     0343- Pt regained pulse after around 30 seconds of CPR. Pt A &Ox4. RRT RN at bedside. MD at bedside. See orders.     0425- RRT RN at bedside talking with patient at bedside. RRT called out d/t patient bradycardia and loss of consciousness. Nursing responded to pt room. Pulseless. Compressions started. Code BLUE called.     0426- Pt regained pulse after around 30 seconds of CPR. Pt alert and oriented. MD at bedside.     0526- Nursing at bedside. Pt stating she doesn't feel good. Pt bradycardic on monitor. Pulseless. Compressions started, Code BLUE called.     0527- Pt regained pulse after around 30 seconds of CPR. Pt alert and oriented. MD at bedside. See orders.      0645- Pt states \"I don't feel good, I feel it happening again\". Pt HR down to 36, isuprel titrated, see MAR.     0655- Intensivist at bedside. See orders.

## 2025-03-03 NOTE — ANESTHESIA PRE PROCEDURE
ROS  (+) GERD:          Endo/Other: Negative Endo/Other ROS   (+) : arthritis:..                 Abdominal:             Vascular: negative vascular ROS.         Other Findings:       Anesthesia Plan      MAC     ASA 4 - emergent       Induction: intravenous.      Anesthetic plan and risks discussed with patient.                    Tanisha Brown MD   3/3/2025

## 2025-03-03 NOTE — PROGRESS NOTES
[facility]             GI PROGRESS NOTE        NAME: Valeria Lo   :  1950   MRN:  [id number]       Subjective:   Code blue overnight with asystole, CPR for 30 seconds with ROSC.   Received dual-chamber pacemaker today   Cardiology notes vasovagal response with gradual slowing of her sinus rate followed by junctional rhythm and subsequent prolonged pauses. Unclear precipitating factors but may be related to her underlying GI pathology     She has had 1 year of nausea, vomiting, cyclical diarrhea and constipation, poor PO intake     GES showing gastroparesis  Barium swallow with poor esophageal motility  Planned for egd/colonoscopy outpatient     CT c/abd/pelv chronic pancreatitis otherwise no acute findings  Objective:         VITALS:   Last 24hrs VS reviewed since prior progress note. Most recent are:  Vitals:    25 1400   BP: 136/78   Pulse: 72   Resp: 16   Temp:    SpO2: 100%       Intake/Output Summary (Last 24 hours) at 3/3/2025 1655  Last data filed at 3/3/2025 1057  Gross per 24 hour   Intake 3994.67 ml   Output 15 ml   Net 3979.67 ml       PHYSICAL EXAM:  General: Alert, in no acute distress    HEENT: Anicteric sclerae.  Lungs:            CTA Bilaterally.   Heart:  Regular  rhythm,    Abdomen: Soft, Non distended, Non tender.  (+)Bowel sounds, no HSM  Extremities: No c/c/e  Neurologic:  CN 2-12 gi, Alert and oriented X 3.  No acute neurological distress   Psych:   Good insight. Not anxious nor agitated.    Lab Data Reviewed:   Recent Labs     25  0358   WBC 9.6 6.4   HGB 12.5 9.6*   HCT 38.6 29.2*    219     Recent Labs     25  0801 25  0358     --  142   K 3.1*  --  4.1     --  115*   CO2 13*  --  19*   BUN 23*  --  11   PHOS  --  2.9 1.9*  1.9*     Recent Labs     25  0358   GLOB 3.4 3.0       ________________________________________________________________________  Patient

## 2025-03-03 NOTE — PROGRESS NOTES
Spiritual Health History and Assessment/Progress Note  [facility]    Follow-up, Code Blue, Adjustment to illness,      Name: Valeria Lo MRN: [id number]    Age: 74 y.o.     Sex: female   Language: English   Mormonism: Other   Lactic acidosis     Date: 3/3/2025            Total Time Calculated: 10 min              Spiritual Assessment continued in [facility] A[facility] INTENSIVE CARE UNIT        Referral/Consult From: Rounding   Encounter Overview/Reason: Follow-up  Service Provided For: Patient and family together    Nikia, Belief, Meaning:   Patient identifies as spiritual and has beliefs or practices that help with coping during difficult times  Family/Friends identify as spiritual and have beliefs or practices that help with coping during difficult times      Importance and Influence:  Patient has spiritual/personal beliefs that influence decisions regarding their health  Family/Friends have spiritual/personal beliefs that influence decisions regarding the patient's health    Community:  Patient feels well-supported. Support system includes: Spouse/Partner and Children  Family/Friends feel well-supported. Support system includes: Parent/s and Extended family    Assessment and Plan of Care:     Patient Interventions include: Facilitated expression of thoughts and feelings and Other: Provided spiritual presence and active listening as patient shared she had just had surgery for pacemaker insertion. She was a little tearful as she talked with her , who was at her bedside. Acknowledged her feelings and offered words of support. Assured patient and family of prayers on their behalf. They expressed appreciation.  Family/Friends Interventions include: Facilitated expression of thoughts and feelings and Other: Patient's daughter, , and another family member were at her bedside. They shared that they were doing well considering how the night had been. Acknowledged their feelings and offered words of  support.    Patient Plan of Care: Spiritual Care available upon further referral  Family/Friends Plan of Care: Spiritual Care available upon further referral      Rev Brittney Kinney MDiv, BCC  To page : [phone])

## 2025-03-03 NOTE — PROGRESS NOTES
Spiritual Health History and Assessment/Progress Note  [facility]    Crisis, Code Blue,  ,      Name: Valeria Lo MRN: [id number]    Age: 74 y.o.     Sex: female   Language: English   Moravian: Other   Lactic acidosis     Date: 3/3/2025            Total Time Calculated: 29 min              Spiritual Assessment continued in [facility] A[facility] INTENSIVE CARE UNIT        Referral/Consult From: Multi-disciplinary team   Encounter Overview/Reason: Crisis  Service Provided For: Patient, Family    Nikia, Belief, Meaning:   Patient has beliefs or practices that help with coping during difficult times  Family/Friends have beliefs or practices that help with coping during difficult times      Importance and Influence:  Patient has spiritual/personal beliefs that influence decisions regarding their health  Family/Friends have spiritual/personal beliefs that influence decisions regarding the patient's health    Community:  Patient feels well-supported. Support system includes: Spouse/Partner, Children, and Extended family  Family/Friends feel well-supported. Support system includes: Spouse/Partner, Children, and Extended family    Assessment and Plan of Care:     Patient Interventions include: Affirmed coping skills/support systems  Family/Friends Interventions include: Affirmed coping skills/support systems     responded to Code Blue. Provided support to family and staff. Shared  availability.    Patient Plan of Care: Spiritual Care available upon further referral  Family/Friends Plan of Care: Spiritual Care available upon further referral    Electronically signed by JAN Sheriff on 3/3/2025 at 8:24 AM

## 2025-03-03 NOTE — PROGRESS NOTES
Called just now on patient for syncope Saturday pm, prolonged pauses, asystole, on isoprel.    Have engaged Interventional and EP to place temp wire this am.

## 2025-03-03 NOTE — PROGRESS NOTES
I spoke to Dr. Morgan about syncopal spells associated with asystole and then bradycardia - reviewed tele strips.     I spoke to Dr. Rodriguez (interventional cardiology).  Our impression is that patient likely had a vasovagal episode leading to transient asystole / bradycardia.  She has a narrow complex and stable rhythm at baseline.  We recommended starting patient on Isoproterenol and also placing external pacing pads on patient.  If patient has recurrent bradycardia/asystole on isoproterenol then Dr. Rodriguez says he'll come in and insert temporary pacing wires. Given recurrent syncope leading to admission, she probably needs a PPM and we'll have EP see patient this morning.     I called and reviewed above with Dr. Morgan.

## 2025-03-03 NOTE — SIGNIFICANT EVENT
Hospitalist desi    Responded to overhead code blue to rm 473 called at 3:43am.    Nursing staff reported was about to draw blood when patient sudden grab staff's arm and began to have convulsion and was unresponsive.  Asystole on monitor and no pulse felt so code blue called and CPR done for 30 seconds with ROSC and she became responsive.      Complains of midsternal chest pain from CPR and feeling groggy.  No SOB, n/v since admission.  No chest pain, palpitations prior to syncope.    169/118  BS 93    Gen --awake, +sweats on face. e admission.    CV -- RRR, no murmur, no edema, 2+ radial pulse  Chest -- CTAB, no wheezing, no accessory muscle use  Abd +BS, soft, NT  Neuro -- oriented to self, place, recognized me from admission, moving all extremities, speech fluent.    A/P  Asystole/long sinus pause with syncope, ?vasovagal from blood draw  --apply pacer meds; meds reviewed -- no AV severo blocking agent.    --cardiology consult, echo  --AM labs to be drawn.  Check lactic, trop q2h x 2, CBC, CMP, phos    Convulsion, due to hypoperfusion  --rapid eeg    Patient declined to have  called at this time.    EKG reviewed personally: SR 66, no ischemia, normal QT    Telemetry strip:      Critical care time: 30 minutes excluding procedure.    Narcisa Morgan MD

## 2025-03-03 NOTE — PROGRESS NOTES
Rapid Called at 0426    Responded to RRT at 0426 for CODE BLUE    Provider at bedside: YES  Interventions ordered: Labs and EKG  Sepsis Suspected: No  Transfer to Higher Level of Care: No      Code blue:  arrived in patient room.  Patient is awake, reports feeling \"bad\".  /118, HR 75 NSR, SaO2 100%, diaphoretic and pale.  Patient received O2, at 6 L, Cerebel applied, EKG performed.  Dr Morgan present at the bedside for evaluation.        Vitals:    03/03/25 0615   BP: (!) 146/60   Pulse: 87   Resp: 14   Temp:    SpO2: 100%        Rapid Ended at 0500        0528 Code Blue, patient was in NSR, gayatri then asystole, 30 compressions and patient awoke with spontaneous return to NSR.  External pacer pads applied, Isuprel gtt started.  Patient had additional gayatri event with asystole with 27 compressions then she became awake.returned to NSR.    0530 code end    SANJUANA SAINI RN

## 2025-03-03 NOTE — H&P
Multiple Vitamins-Minerals (PRESERVISION AREDS PO) Take by mouth    Provider, MD Lewis   denosumab (PROLIA) 60 MG/ML SOSY SC injection Inject 1 mL into the skin    Automatic Reconciliation, Ar       Allergies/Social/Family History:     No Known Allergies   Social History     Tobacco Use    Smoking status: Former     Current packs/day: 0.00     Types: Cigarettes     Quit date: 2015     Years since quittin.5    Smokeless tobacco: Former     Quit date: 2015   Substance Use Topics    Alcohol use: Yes     Alcohol/week: 14.0 standard drinks of alcohol     Types: 14 Standard drinks or equivalent per week     Comment: 2 scotch a day      No family history on file.    Review of Systems:   Review of Systems  Negative other than above.    Objective:   Vital Signs:  /78   Pulse 72   Temp 98.6 °F (37 °C) (Oral)   Resp 16   Ht 1.651 m (5' 5\")   Wt 50.8 kg (112 lb)   SpO2 100%   BMI 18.64 kg/m²      Temp (24hrs), Av.1 °F (36.7 °C), Min:97.6 °F (36.4 °C), Max:98.6 °F (37 °C)           Intake/Output:     Intake/Output Summary (Last 24 hours) at 3/3/2025 1532  Last data filed at 3/3/2025 1057  Gross per 24 hour   Intake 3994.67 ml   Output 15 ml   Net 3979.67 ml       Physical Exam:  Patient comfortable appearing nontoxic.  Alert oriented not in any distress.  She has Seroquel in place.  Vital signs sinus tachycardia to about 1 10-1 20s.  Blood pressure within normal limits.  Did have episode of sudden onset bradycardia that ended with sinus arrest with brief asystole before which she recovered again.  This time did not need code chest compressions.  Afebrile.  Nontoxic-appearing.  No rashes.  CVS: S1-S2 are normally no murmurs appreciated.  RS bilateral equal air entry no abnormal breath sounds appreciated.  Abdomen soft: Nontender no organomegaly.  CNS: Nonfocal.  Active Problem List:       ICU Assessment/ Comprehensive Plan of Care:     Ms Cohen is a 74-year-old lady with history of  billed separately.  [facility] critical care  3/3/2025

## 2025-03-03 NOTE — PROCEDURES
Pacemaker Implantation    Procedure Date: 03/03/25  Lab Physician: Julieta Reyez MD, [facility], [facility]    INDICATIONS:  74-year-old woman with a history of hypertension, chronic back pain, anxiety, hyperlipidemia, presenting with generalized weakness and near syncope found to have recurrent vasovagal pauses of >9 seconds with associated syncope. TSH within normal limits, no reversible etiology. Given recurrent sustained pauses of unclear triggers or preventable triggers and the recurrent nature of loss of consciousness, family would like to proceed with permanent pacemaker to help stabilize her clinical condition to help facilitate further GI workup.     COMMENTS:  After informed consent was obtained, the patient was brought to the electrophysiology laboratory in the fasting state, and was prepped and draped in the usual sterile fashion. IV antibiotic was administered prophylactically. Conscious sedation was administered by the anesthesia staff independent of those performing the procedure under my supervision with intermittent dosing of anxiolytics and narcotics.    Ultrasound-guided Access  Local anesthetic was delivered to the left pectoral region and an incision was made in the left deltopectoral groove. The axillary vein was accessed using a micropuncture needle with ultrasound guidance (ultrasound evaluation of possible access sites. Patency of the selected vessel.  Realtime visualization of the vascular needle entry was performed) and the vein was cannulated and a retaining wire was placed in the IVC under fluoroscopy. A 7F peel-away sheath was placed in the vein and a Medtronic lead was advanced to the RV apex under fluoroscopic guidance. Ventricular sensing and pacing thresholds were checked and were good.    A 7F peel away sheath was then placed in the vein along with the ventricular lead over the retained glide wire, and a Medtronic lead was placed in the RAA under fluoroscopic guidance. Atrial pacing and  sensing thresholds were checked and were good. Pacing at 10V was performed from the ventricular lead without diaphragmatic or intercostal capture. The leads were sutured to the underlying pectoralis muscle using 0 Silk suture. Final pacing and sensing thresholds were checked and were good.     Wound Closure  A subcutaneous pocket was then created using blunt dissection and it was copiously irrigated with a saline solution containing antibiotics. The leads were connected to a Medtronic generator and the entire system was implanted into the pocket. The subcutaneous tissues were then closed with 2 continuous layers of 2-0 Stratafix sutures.  The skin was closed with a running 4-0 Stratafix subcuticular stitch. Steri strips were placed over the incision. The wound was covered with a dry sterile dressing.    The patient tolerated the procedure well and left the laboratory in good condition. Conscious sedation was provided and appropriate monitoring performed by members of the EP nursing staff.    CONCLUSIONS:  - Successful implantation of a Medtronic dual chamber Pacemaker.  - Device programmed to rate drop: AAI-DDD  bpm, drop rate of 25 bpm, rate drop 60 bpm, treatment at 110 bpm x2 mins.     RECOMMENDATIONS:  1. CXR to assess for leads placement and rule out complications  2. F/U device clinic/wound check in 10-14 days  3. EP clinic follow-up in 4 months.

## 2025-03-03 NOTE — SIGNIFICANT EVENT
Responded to second code blue called at 4:26am.    I was sitting outside of patient's room and telemetry alarmed came on.  HR 18 and then asystole.  Rapid response nurse was in room, talking to patient during this time.  Patient unresponsive.  CPR started and after about 30 sec to 1 min, ROSC obtained and patient awoken and conversant.  No convulsion noted.  Complains of increased chest wall soreness.      Imp:  Recurrent sinus pause/asystole with syncope.  This episode not c/w vasovagal.    Cardiology called, dw Dr. Dhruv Escalera.  This is second event within 1 hour. Ordered isuprel drip per his recommended.  He discussed with interventionalist.  They feel patient with vasovagal syncope.  No plan for temporary pacemaker unless asystole/pause again on isuprel, probably needs permanent pacemaker.    Stop reglan which was new medication started yesterday as per U[employer] can cause bradycardia and AV block.    AM labs reviewed, trop 22, hgb 9.6 down from 12.5 probably due to hemodilution with IVF for lactic acidosis on admission, no clinical evidence of bleeding.    Transfer to ICU level of care and change from obs to inpatient admission.  Discussed with ICU NP Shauna Gonsales.    Spoke to  at bedside when he arrived.    Additional critical care time: 40 minutes, excluding procedure (total 75 minutes)    Narcisa Morgan MD

## 2025-03-03 NOTE — CARE COORDINATION
Care Management Initial Assessment  3/3/2025 3:16 PM  If patient is discharged prior to next notation, then this note serves as note for discharge by case management.    Reason for Admission:   Lactic acidosis [E87.20]  Metabolic acidosis [E87.20]  Hypoglycemia [E16.2]  Hypothermia, initial encounter [T68.XXXA]  Other fatigue [R53.83]  Sinus pause [I45.5]  Procedure(s) (LRB):  Insert PPM dual (N/A)  Day of Surgery    Patient Admission Status: Inpatient  Date Admitted to INP: 12%  RUR: Readmission Risk Score: 11.9    Hospitalization in the last 30 days (Readmission):  no        Advance Care Planning:  Code Status: Full Code  Primary Healthcare Decision Maker:  Jaison De La Torre    Advance Directive: has an advanced directive - a copy has been provided     __________________________________________________________________________  Assessment:   Admitted with  generalized weakness and near syncope( found to have recurrent vasovagal pauses of >9 seconds with associated syncope. )    Comments: dual chamber medtronic pacemaker placed today    Discharge Concerns: []Yes [x]No []Unknown   Describe:    Financial concerns/barriers: []Yes, explain: [x]No []Unknown/Not discussed  __________________________________________________________________________    Insurer:   Active Insurance as of 3/2/2025       Primary Coverage       Payor Plan Insurance Group Employer/Plan Group    MEDICARE MEDICARE PART A AND B        Payor Address Payor Phone Number Payor Fax Number Effective Dates    [address] [address] [phone]  11/1/2016 - None Entered    [facility] [address]         Subscriber Name Subscriber Birth Date Member ID       TINO DE LA TORRE 1950 [id number]               Secondary Coverage       Payor Plan Insurance Group Employer/Plan Group    [place] SUPP [id number]       Payor Address Payor Phone Number Payor Fax Number Effective Dates    [address] [address] [phone]  8/1/2017 - None Entered    [place]  [facility] [address]         Subscriber Name Subscriber Birth Date Member ID       TINO DE LA TORRE 1950 [id number]                     PCP: Beatris Vick MD   Address: [address] [address] / [facility] [address]   Phone number: [phone]    Pharmacy:   [id number] - [place], [place] - [address] [facility] [phone] - F [phone]  [address] [facility] [address]  Phone: [phone] Fax: [phone]    [place] Transport:         Transition of care plan:    []Unable to determine at this time. Awaiting clinical progress, and disposition recommendations.    [] Home. No assistance required.     [] Home. Pt refused recommended services.    [x] Home with family assistance as needed, and outpatient follow-up.    [] Home with Outpatient PT and outpatient follow-up   Pt aware of OP appt? []Yes, Provider:   []Not scheduled   Transport provider:     [] Home with outpatient services.    Specify:    [] Home with Home Health   - [place] of Choice offered? [] Yes, Preference:   [] NA    []SNF/IPR   -[]Freedom of Choice offered, and preferences given:   []Listing provided and preferences requested   -Status: []Pending []Accepted:    -Auth required: []Yes []No    -Auth initiated date:   -3 midnight stay required: []Yes []No  Date satisfied:     [] LTC:     [] Home with Hospice   - [place] of Choice offered? [] Yes, Preference:   [] NA    [] [facility] information provided.     [x] Other: At this time,there are no anticipated home health,rehab or dME needs but will follow in case needs change.      APRIL MONTGOMERY RN  Case Management Department  For questions or concerns, please PerfectServe

## 2025-03-03 NOTE — CONSULTS
[facility] CARDIOLOGY  Cardiac Electrophysiology Note     [x]Initial Encounter     []Follow-up    Patient Name: Valeria JORGE [facility] - :1950 - MRN:[id number]  Primary Cardiologist: None  Consulting Cardiologist: Julieta Reyez MD, [facility], [place]       Reason for encounter:   Syncope and sustained pause      HPI:  74-year-old woman with a history of hypertension, chronic back pain, anxiety, hyperlipidemia, presenting with generalized weakness and near syncope.  Per family reports she has had decreased p.o. intake with recent weight loss.  Was having symptoms of near syncope, jittery sensation.  EMS was called and her blood sugar was noted to be low at 29.  She was given D10 and route to the ER.  Initial presentation was notable for low rectal temperature and hypertension.  She had elevated lactate.  Denies of any fevers or chills.  She has been having abdominal pain, nausea and diarrhea with loss of appetite and intermittent vomiting.  Scheduled for EGD/colonoscopy on 3/4/2025. Had gastric emptying scan abnormal 10/24 and esophageal dysmotility on barium swallow  but reports has not seen a GI doctor in office.  Started on Creon by pcp 1 month ago for \"chronic pancreatitis\"     Following admission to the hospital she was noted to have multiple episodes of intermittent diaphoresis, nausea and recurrent sinus slowing prior to extended pauses.  Notably overnight she had pauses of up to 9.6 seconds multiple times.  She was started on isoproterenol in the CCU.  Multiple episodes occur throughout this morning including 1 witnessed episode with prodrome of nausea, diaphoresis with subsequent gradual slowing of her sinus rate followed by pause responded with atropine.    SUBJECTIVE:  Currently anxious, complaint of nausea, symptoms of diaphoresis.  Vomited while was in the room     Assessment and Plan     Severe recurrent sinus pause/syncope  She has now had  Oral, PRN **OR** potassium bicarb-citric acid (EFFER-K) effervescent tablet 40 mEq, 40 mEq, Oral, PRN **OR** potassium chloride 10 mEq/100 mL IVPB (Peripheral Line), 10 mEq, IntraVENous, PRN, Narcisa Morgan MD    magnesium sulfate 2000 mg in 50 mL IVPB premix, 2,000 mg, IntraVENous, PRN, Narcisa Morgan MD    ondansetron (ZOFRAN-ODT) disintegrating tablet 4 mg, 4 mg, Oral, Q8H PRN **OR** ondansetron (ZOFRAN) injection 4 mg, 4 mg, IntraVENous, Q6H PRN, Narcisa Morgan MD    polyethylene glycol (GLYCOLAX) packet 17 g, 17 g, Oral, Daily PRN, Narcisa Morgan MD    acetaminophen (TYLENOL) tablet 650 mg, 650 mg, Oral, Q6H PRN, 650 mg at 03/03/25 0523 **OR** acetaminophen (TYLENOL) suppository 650 mg, 650 mg, Rectal, Q6H PRN, Narcisa Morgan MD    enoxaparin (LOVENOX) injection 40 mg, 40 mg, SubCUTAneous, Daily, Narcisa Morgan MD, 40 mg at 03/02/25 0838    diatrizoate meglumine-sodium (GASTROGRAFIN) 66-10 % solution 30 mL, 30 mL, Oral, ONCE PRN, Zbigniew Mensah MD    lipase-protease-amylase (ZENPEP) delayed release capsule 5,000 Units, 5,000 Units, Oral, TID Jacquelin GROVER Maitri S, MD, 5,000 Units at 03/02/25 1643    lipase-protease-amylase (ZENPEP) 34385-06047 units delayed release capsule 20,000 Units, 20,000 Units, Oral, TID Jacquelin GROVER Maitri S, MD, 20,000 Units at 03/02/25 1642    traZODone (DESYREL) tablet 50 mg, 50 mg, Oral, Nightly, Kusum Roper MD, 50 mg at 03/02/25 2006    pantoprazole (PROTONIX) tablet 40 mg, 40 mg, Oral, QAM AC, Kusum Roper MD, 40 mg at 03/03/25 0523    rosuvastatin (CRESTOR) tablet 10 mg, 10 mg, Oral, Nightly, Kusum Roper MD, 10 mg at 03/02/25 2006    antioxidant multivitamin (OCUVITE) tablet, 1 tablet, Oral, Daily, Kusum Roper MD An H Bui, MD  [facility] [address], [address]  [place], [place] [address] [phone]      CC:Beatris Vick MD

## 2025-03-03 NOTE — PROGRESS NOTES
Comprehensive Nutrition Assessment    Type and Reason for Visit:  Initial    Nutrition Recommendations/Plan:   Continue regular diet order  Provide Ensure High Protein BID (320 kcal, 38 g carbs, 32 g protein)   Replete phos      Malnutrition Assessment:  Malnutrition Status:  Insufficient data (unable to perform) (03/03/25 1455)    Context:  Acute Illness     Findings of the 6 clinical characteristics of malnutrition:  Energy Intake:  No decrease in energy intake  Weight Loss:  Unable to assess     Body Fat Loss:  Unable to assess     Muscle Mass Loss:  Unable to assess    Fluid Accumulation:  No fluid accumulation     Strength:  Not Performed    Nutrition Assessment:     Patient is a 74 year old female admitted with Lactic acidosis [E87.20]  Metabolic acidosis [E87.20]  Hypoglycemia [E16.2]  Hypothermia, initial encounter [T68.XXXA]  Other fatigue [R53.83]  Sinus pause [I45.5]. She  has a past medical history of Arthritis, Chronic back pain, Chronic pancreatitis (HCC), Hypertension, and Psychiatric disorder.  MST for wt loss 14-23#. S/p pacer today due to 2 code blues yesterday.  Admit weight stated - awaiting nursing to obtain standing scale weight. Noted previous loss last year of 12# (8.6%) x 6 months, not clinically significant for timeframe.  H&P reports 20# loss x 1 year. Patient unavailable x 3 attempts today. No PO intake has been documented over last several days; ONS intake documented despite no ONS ordered yesterday. Phos low, other lytes WDL. BG labile - had hypoglycemia PTA.    Wt Readings from Last 10 Encounters:   03/03/25 50.8 kg (112 lb)   09/30/24 57.6 kg (126 lb 14.4 oz)   08/14/24 59 kg (130 lb)   03/12/24 62.7 kg (138 lb 4.8 oz)   08/28/23 61.2 kg (135 lb)     Meal Intake:   No data found.  Supplement Intake:  Patient Vitals for the past 168 hrs:   PO Supplement (%)   03/02/25 1823 76 - 100%       Nutrition Related Findings:      Wound Type: None     Last BM: 03/03/25  Edema: None

## 2025-03-03 NOTE — PROGRESS NOTES
Spiritual Health History and Assessment/Progress Note  [facility]    Crisis, Code Blue,  ,      Name: Valeria Lo MRN: [id number]    Age: 74 y.o.     Sex: female   Language: English   Confucianist: Other   Lactic acidosis     Date: 3/3/2025            Total Time Calculated: 93 min              Spiritual Assessment began in [facility] A[facility] INTENSIVE CARE UNIT        Referral/Consult From: Multi-disciplinary team   Encounter Overview/Reason: Crisis  Service Provided For: Patient    Nikia, Belief, Meaning:   Patient has beliefs or practices that help with coping during difficult times  Family/Friends No family/friends present      Importance and Influence:  Patient has spiritual/personal beliefs that influence decisions regarding their health  Family/Friends No family/friends present    Community:  Patient feels well-supported. Support system includes: Spouse/Partner, Children, and Extended family  Family/Friends No family/friends present    Assessment and Plan of Care:     Patient Interventions include: Facilitated expression of thoughts and feelings, Explored spiritual coping/struggle/distress, and Affirmed coping skills/support systems  Family/Friends Interventions include: No family/friends present     paged and responded to the code. Provided support to pt. Pt allie through family support. He daughters live nearby. Pt has 4 grandchildren, 2 teenagers and 2 in elementary school. Pt's  has children and grandchildren that live in [place].Pt is a retired middle school special  . Pt shared her career brought her great monica. Pt's  arrived. Provided support to family and staff. Shared  availability.     Patient Plan of Care: Spiritual Care available upon further referral  Family/Friends Plan of Care: Spiritual Care available upon further referral    Electronically signed by JAN Sheriff on 3/3/2025 at 5:33 AM

## 2025-03-03 NOTE — PROGRESS NOTES
Spiritual Health History and Assessment/Progress Note  [facility]    Crisis, Code Blue,  ,      Name: Valeria Lo MRN: [id number]    Age: 74 y.o.     Sex: female   Language: English   Bahai: Other   Lactic acidosis     Date: 3/3/2025            Total Time Calculated: 68 min              Spiritual Assessment continued in [facility] A[facility] INTENSIVE CARE UNIT        Referral/Consult From: Nurse   Encounter Overview/Reason: Crisis  Service Provided For: Patient, Family    Nikia, Belief, Meaning:   Patient has beliefs or practices that help with coping during difficult times  Family/Friends have beliefs or practices that help with coping during difficult times      Importance and Influence:  Patient has spiritual/personal beliefs that influence decisions regarding their health  Family/Friends have spiritual/personal beliefs that influence decisions regarding the patient's health    Community:  Patient feels well-supported. Support system includes: Spouse/Partner, Children, and Extended family  Family/Friends feel well-supported. Support system includes: Spouse/Partner, Children, and Extended family    Assessment and Plan of Care:     Patient Interventions include: Affirmed coping skills/support systems  Family/Friends Interventions include: Facilitated expression of thoughts and feelings and Affirmed coping skills/support systems    Patient Plan of Care: Spiritual Care available upon further referral  Family/Friends Plan of Care: Spiritual Care available upon further referral  Provided support to family, , Anirudh, daughter, An, who is a nurse at [employer]. Support to staff. Shared  availabiity.    Electronically signed by JAN Sheriff on 3/3/2025 at 8:28 AM

## 2025-03-03 NOTE — DISCHARGE INSTRUCTIONS
Pacemaker  Discharge Instructions    Please make sure you have received your Temporary Pacemaker identification card with your discharge instructions      MEDICATIONS        Take only the medications prescribed to you at discharge.      ACTIVITY        Return to your normal activity, except as noted below.    Do not lift anything heavier than 10 pounds for 4 weeks with the affected arm.  This is how long it takes the muscles to heal, and the leads inside your heart to stabilize their position.  Do not reach above your head with the affected arm for 4 weeks, doing so increases the risk of lead dislodgement.    It is, however, important to move the affected arm to prevent shoulder stiffness and locking.  Avoid tight clothes or unnecessary pressure over your incision (such as bra straps or seat belts).  If it is tender or sensitive to clothing, cover the incision with a soft dressing or pad.  Avoid driving for at least 72 hours.   You may resume all other activities after 4 weeks (including exercise, sex, etc.)      SHOWERING        Leave the bandage over your incision until your clinic follow up in 10-14 days after the Pacemaker implant.  You bandage will be removed in clinic during that appointment.     It is important to keep the bandaged area clean and dry.  You may shower around the site until the bandage is removed in clinic. Thereafter, you may shower after the bandage is removed, washing it gently with soap and water. Do not apply any lotions, powders, or perfumes to the incision line.    Avoid submerging your incision in water (tub baths, hot tubs, or swimming) for four weeks.     Underneath the dressing.    If you have white steri-strips over your incision (underneath the gauze dressing), they will curl up at the end and fall off, usually within 10 days.  Do not pull them off.  - OR -   You may have a different type of closure for the incision including a dermabond adhesive which will slowly peel and come off  [facility] [place]  [facility]  [address] [address], [address]        [address] [facility] [address] [facility], [address]  [address]  [address] [phone] / [phone] Fax       [phone] / [phone] Fax

## 2025-03-03 NOTE — PROGRESS NOTES
0700- {[facility] BEDSIDE_VERBAL_RECORDED_WRITTEN:[id number]} shift change report given to *** (oncoming nurse) by *** (offgoing nurse). Report included the following information {[facility] REPORTS LIST:[id number]}.

## 2025-03-03 NOTE — ANESTHESIA POSTPROCEDURE EVALUATION
Department of Anesthesiology  Postprocedure Note    Patient: Valeria Lo  MRN: [id number]  YOB: 1950  Date of evaluation: 3/3/2025    Procedure Summary       Date: 03/03/25 Room / Location: [facility] EP LAB / [facility] CARDIAC CATH LAB    Anesthesia Start: 0952 Anesthesia Stop: 1105    Procedure: Insert PPM dual Diagnosis:       Bradycardia      (Bradycardia [R00.1])    Providers: Julieta Reyez MD Responsible Provider: Tanisha Brown MD    Anesthesia Type: MAC ASA Status: 4 - Emergent            Anesthesia Type: MAC    Jeane Phase I:      Jeane Phase II:      Anesthesia Post Evaluation    Patient location during evaluation: PACU  Patient participation: complete - patient participated  Level of consciousness: awake  Airway patency: patent  Nausea & Vomiting: no vomiting and no nausea  Cardiovascular status: hemodynamically stable  Respiratory status: acceptable  Hydration status: stable  Pain management: adequate    There were no known notable events for this encounter.

## 2025-03-04 LAB
ANION GAP SERPL CALC-SCNC: 6 MMOL/L (ref 2–12)
BASOPHILS # BLD: 0.02 K/UL (ref 0–0.1)
BASOPHILS NFR BLD: 0.3 % (ref 0–1)
BUN SERPL-MCNC: 5 MG/DL (ref 6–20)
BUN/CREAT SERPL: 8 (ref 12–20)
CALCIUM SERPL-MCNC: 8.5 MG/DL (ref 8.5–10.1)
CHLORIDE SERPL-SCNC: 110 MMOL/L (ref 97–108)
CO2 SERPL-SCNC: 24 MMOL/L (ref 21–32)
CREAT SERPL-MCNC: 0.6 MG/DL (ref 0.55–1.02)
DIFFERENTIAL METHOD BLD: ABNORMAL
EKG ATRIAL RATE: 66 BPM
EKG DIAGNOSIS: NORMAL
EKG P AXIS: 70 DEGREES
EKG P-R INTERVAL: 174 MS
EKG Q-T INTERVAL: 430 MS
EKG QRS DURATION: 94 MS
EKG QTC CALCULATION (BAZETT): 450 MS
EKG R AXIS: 45 DEGREES
EKG T AXIS: 41 DEGREES
EKG VENTRICULAR RATE: 66 BPM
EOSINOPHIL # BLD: 0.02 K/UL (ref 0–0.4)
EOSINOPHIL NFR BLD: 0.3 % (ref 0–7)
ERYTHROCYTE [DISTWIDTH] IN BLOOD BY AUTOMATED COUNT: 13.5 % (ref 11.5–14.5)
GLUCOSE BLD STRIP.AUTO-MCNC: 102 MG/DL (ref 65–117)
GLUCOSE BLD STRIP.AUTO-MCNC: 103 MG/DL (ref 65–117)
GLUCOSE BLD STRIP.AUTO-MCNC: 98 MG/DL (ref 65–117)
GLUCOSE SERPL-MCNC: 95 MG/DL (ref 65–100)
HCT VFR BLD AUTO: 30.7 % (ref 35–47)
HGB BLD-MCNC: 10.2 G/DL (ref 11.5–16)
IMM GRANULOCYTES # BLD AUTO: 0.03 K/UL (ref 0–0.04)
IMM GRANULOCYTES NFR BLD AUTO: 0.4 % (ref 0–0.5)
LYMPHOCYTES # BLD: 1.41 K/UL (ref 0.8–3.5)
LYMPHOCYTES NFR BLD: 18.7 % (ref 12–49)
MCH RBC QN AUTO: 30.4 PG (ref 26–34)
MCHC RBC AUTO-ENTMCNC: 33.2 G/DL (ref 30–36.5)
MCV RBC AUTO: 91.4 FL (ref 80–99)
MONOCYTES # BLD: 0.79 K/UL (ref 0–1)
MONOCYTES NFR BLD: 10.5 % (ref 5–13)
NEUTS SEG # BLD: 5.27 K/UL (ref 1.8–8)
NEUTS SEG NFR BLD: 69.8 % (ref 32–75)
NRBC # BLD: 0 K/UL (ref 0–0.01)
NRBC BLD-RTO: 0 PER 100 WBC
PHOSPHATE SERPL-MCNC: 2.4 MG/DL (ref 2.6–4.7)
PLATELET # BLD AUTO: 181 K/UL (ref 150–400)
PMV BLD AUTO: 9.6 FL (ref 8.9–12.9)
POTASSIUM SERPL-SCNC: 3.5 MMOL/L (ref 3.5–5.1)
RBC # BLD AUTO: 3.36 M/UL (ref 3.8–5.2)
SERVICE CMNT-IMP: NORMAL
SODIUM SERPL-SCNC: 140 MMOL/L (ref 136–145)
WBC # BLD AUTO: 7.5 K/UL (ref 3.6–11)

## 2025-03-04 PROCEDURE — 2500000003 HC RX 250 WO HCPCS: Performed by: HOSPITALIST

## 2025-03-04 PROCEDURE — 85025 COMPLETE CBC W/AUTO DIFF WBC: CPT

## 2025-03-04 PROCEDURE — 97161 PT EVAL LOW COMPLEX 20 MIN: CPT

## 2025-03-04 PROCEDURE — 6360000002 HC RX W HCPCS: Performed by: HOSPITALIST

## 2025-03-04 PROCEDURE — 2000000000 HC ICU R&B

## 2025-03-04 PROCEDURE — 99233 SBSQ HOSP IP/OBS HIGH 50: CPT | Performed by: HOSPITALIST

## 2025-03-04 PROCEDURE — 6370000000 HC RX 637 (ALT 250 FOR IP): Performed by: HOSPITALIST

## 2025-03-04 PROCEDURE — 6370000000 HC RX 637 (ALT 250 FOR IP): Performed by: FAMILY MEDICINE

## 2025-03-04 PROCEDURE — 2700000000 HC OXYGEN THERAPY PER DAY

## 2025-03-04 PROCEDURE — 97116 GAIT TRAINING THERAPY: CPT

## 2025-03-04 PROCEDURE — 6370000000 HC RX 637 (ALT 250 FOR IP): Performed by: NURSE PRACTITIONER

## 2025-03-04 PROCEDURE — 2580000003 HC RX 258: Performed by: HOSPITALIST

## 2025-03-04 PROCEDURE — 2060000000 HC ICU INTERMEDIATE R&B

## 2025-03-04 PROCEDURE — 84100 ASSAY OF PHOSPHORUS: CPT

## 2025-03-04 PROCEDURE — 82962 GLUCOSE BLOOD TEST: CPT

## 2025-03-04 PROCEDURE — 6360000002 HC RX W HCPCS

## 2025-03-04 PROCEDURE — 80048 BASIC METABOLIC PNL TOTAL CA: CPT

## 2025-03-04 PROCEDURE — 36415 COLL VENOUS BLD VENIPUNCTURE: CPT

## 2025-03-04 RX ORDER — DIAZEPAM 10 MG/2ML
10 INJECTION, SOLUTION INTRAMUSCULAR; INTRAVENOUS
Status: DISCONTINUED | OUTPATIENT
Start: 2025-03-04 | End: 2025-03-04 | Stop reason: SDUPTHER

## 2025-03-04 RX ORDER — LOSARTAN POTASSIUM 50 MG/1
100 TABLET ORAL DAILY
Status: DISCONTINUED | OUTPATIENT
Start: 2025-03-04 | End: 2025-03-07 | Stop reason: HOSPADM

## 2025-03-04 RX ORDER — DIAZEPAM 5 MG/1
5 TABLET ORAL
Status: DISCONTINUED | OUTPATIENT
Start: 2025-03-04 | End: 2025-03-04 | Stop reason: SDUPTHER

## 2025-03-04 RX ORDER — DIAZEPAM 5 MG/1
20 TABLET ORAL
Status: DISCONTINUED | OUTPATIENT
Start: 2025-03-04 | End: 2025-03-07 | Stop reason: HOSPADM

## 2025-03-04 RX ORDER — DIAZEPAM 10 MG/2ML
15 INJECTION, SOLUTION INTRAMUSCULAR; INTRAVENOUS
Status: DISCONTINUED | OUTPATIENT
Start: 2025-03-04 | End: 2025-03-07 | Stop reason: HOSPADM

## 2025-03-04 RX ORDER — DIAZEPAM 5 MG/1
10 TABLET ORAL
Status: DISCONTINUED | OUTPATIENT
Start: 2025-03-04 | End: 2025-03-07 | Stop reason: HOSPADM

## 2025-03-04 RX ORDER — THIAMINE HYDROCHLORIDE 100 MG/ML
200 INJECTION, SOLUTION INTRAMUSCULAR; INTRAVENOUS EVERY 8 HOURS
Status: COMPLETED | OUTPATIENT
Start: 2025-03-04 | End: 2025-03-06

## 2025-03-04 RX ORDER — DIAZEPAM 10 MG/2ML
5 INJECTION, SOLUTION INTRAMUSCULAR; INTRAVENOUS EVERY 4 HOURS PRN
Status: DISCONTINUED | OUTPATIENT
Start: 2025-03-04 | End: 2025-03-04 | Stop reason: SDUPTHER

## 2025-03-04 RX ORDER — LORAZEPAM 1 MG/1
3 TABLET ORAL
Status: DISCONTINUED | OUTPATIENT
Start: 2025-03-04 | End: 2025-03-04 | Stop reason: SDUPTHER

## 2025-03-04 RX ORDER — DIAZEPAM 5 MG/1
10 TABLET ORAL
Status: DISCONTINUED | OUTPATIENT
Start: 2025-03-04 | End: 2025-03-04 | Stop reason: SDUPTHER

## 2025-03-04 RX ORDER — LORAZEPAM 1 MG/1
1 TABLET ORAL
Status: DISCONTINUED | OUTPATIENT
Start: 2025-03-04 | End: 2025-03-04 | Stop reason: SDUPTHER

## 2025-03-04 RX ORDER — LORAZEPAM 1 MG/1
4 TABLET ORAL
Status: DISCONTINUED | OUTPATIENT
Start: 2025-03-04 | End: 2025-03-04 | Stop reason: SDUPTHER

## 2025-03-04 RX ORDER — DIAZEPAM 2 MG/1
2 TABLET ORAL
Status: DISCONTINUED | OUTPATIENT
Start: 2025-03-04 | End: 2025-03-04 | Stop reason: SDUPTHER

## 2025-03-04 RX ORDER — SODIUM CHLORIDE 0.9 % (FLUSH) 0.9 %
5-40 SYRINGE (ML) INJECTION EVERY 12 HOURS SCHEDULED
Status: DISCONTINUED | OUTPATIENT
Start: 2025-03-04 | End: 2025-03-07 | Stop reason: HOSPADM

## 2025-03-04 RX ORDER — DIAZEPAM 10 MG/2ML
5 INJECTION, SOLUTION INTRAMUSCULAR; INTRAVENOUS
Status: DISCONTINUED | OUTPATIENT
Start: 2025-03-04 | End: 2025-03-04 | Stop reason: SDUPTHER

## 2025-03-04 RX ORDER — DIAZEPAM 10 MG/2ML
10 INJECTION, SOLUTION INTRAMUSCULAR; INTRAVENOUS
Status: DISCONTINUED | OUTPATIENT
Start: 2025-03-04 | End: 2025-03-07 | Stop reason: HOSPADM

## 2025-03-04 RX ORDER — DIAZEPAM 2 MG/1
2.5 TABLET ORAL 3 TIMES DAILY
Status: DISPENSED | OUTPATIENT
Start: 2025-03-04 | End: 2025-03-07

## 2025-03-04 RX ORDER — DIAZEPAM 10 MG/2ML
2.5 INJECTION, SOLUTION INTRAMUSCULAR; INTRAVENOUS
Status: DISCONTINUED | OUTPATIENT
Start: 2025-03-04 | End: 2025-03-04 | Stop reason: SDUPTHER

## 2025-03-04 RX ORDER — PROCHLORPERAZINE EDISYLATE 5 MG/ML
5 INJECTION INTRAMUSCULAR; INTRAVENOUS EVERY 6 HOURS PRN
Status: DISCONTINUED | OUTPATIENT
Start: 2025-03-04 | End: 2025-03-07 | Stop reason: HOSPADM

## 2025-03-04 RX ORDER — LORAZEPAM 1 MG/1
2 TABLET ORAL
Status: DISCONTINUED | OUTPATIENT
Start: 2025-03-04 | End: 2025-03-04 | Stop reason: SDUPTHER

## 2025-03-04 RX ORDER — SODIUM CHLORIDE, SODIUM LACTATE, POTASSIUM CHLORIDE, AND CALCIUM CHLORIDE .6; .31; .03; .02 G/100ML; G/100ML; G/100ML; G/100ML
500 INJECTION, SOLUTION INTRAVENOUS ONCE
Status: COMPLETED | OUTPATIENT
Start: 2025-03-04 | End: 2025-03-04

## 2025-03-04 RX ORDER — DIAZEPAM 10 MG/2ML
20 INJECTION, SOLUTION INTRAMUSCULAR; INTRAVENOUS
Status: DISCONTINUED | OUTPATIENT
Start: 2025-03-04 | End: 2025-03-07 | Stop reason: HOSPADM

## 2025-03-04 RX ORDER — SODIUM CHLORIDE 0.9 % (FLUSH) 0.9 %
5-40 SYRINGE (ML) INJECTION PRN
Status: DISCONTINUED | OUTPATIENT
Start: 2025-03-04 | End: 2025-03-07 | Stop reason: HOSPADM

## 2025-03-04 RX ORDER — DIAZEPAM 5 MG/1
15 TABLET ORAL
Status: DISCONTINUED | OUTPATIENT
Start: 2025-03-04 | End: 2025-03-07 | Stop reason: HOSPADM

## 2025-03-04 RX ORDER — DIAZEPAM 2 MG/1
5 TABLET ORAL
Status: DISCONTINUED | OUTPATIENT
Start: 2025-03-04 | End: 2025-03-07 | Stop reason: HOSPADM

## 2025-03-04 RX ORDER — DIAZEPAM 5 MG/1
2.5 TABLET ORAL 3 TIMES DAILY
Status: DISCONTINUED | OUTPATIENT
Start: 2025-03-04 | End: 2025-03-04

## 2025-03-04 RX ORDER — HYDRALAZINE HYDROCHLORIDE 20 MG/ML
10 INJECTION INTRAMUSCULAR; INTRAVENOUS ONCE
Status: COMPLETED | OUTPATIENT
Start: 2025-03-04 | End: 2025-03-04

## 2025-03-04 RX ORDER — SODIUM CHLORIDE 9 MG/ML
INJECTION, SOLUTION INTRAVENOUS PRN
Status: DISCONTINUED | OUTPATIENT
Start: 2025-03-04 | End: 2025-03-07 | Stop reason: HOSPADM

## 2025-03-04 RX ORDER — DIAZEPAM 10 MG/2ML
5 INJECTION, SOLUTION INTRAMUSCULAR; INTRAVENOUS
Status: DISCONTINUED | OUTPATIENT
Start: 2025-03-04 | End: 2025-03-07 | Stop reason: HOSPADM

## 2025-03-04 RX ADMIN — LOSARTAN POTASSIUM 100 MG: 50 TABLET, FILM COATED ORAL at 17:53

## 2025-03-04 RX ADMIN — DIAZEPAM 2.5 MG: 5 INJECTION INTRAMUSCULAR; INTRAVENOUS at 14:43

## 2025-03-04 RX ADMIN — THIAMINE HYDROCHLORIDE 200 MG: 100 INJECTION, SOLUTION INTRAMUSCULAR; INTRAVENOUS at 20:52

## 2025-03-04 RX ADMIN — PANCRELIPASE LIPASE, PANCRELIPASE PROTEASE, PANCRELIPASE AMYLASE 40000 UNITS: 20000; 63000; 84000 CAPSULE, DELAYED RELEASE ORAL at 17:47

## 2025-03-04 RX ADMIN — ENOXAPARIN SODIUM 40 MG: 100 INJECTION SUBCUTANEOUS at 20:52

## 2025-03-04 RX ADMIN — PANCRELIPASE LIPASE, PANCRELIPASE PROTEASE, PANCRELIPASE AMYLASE 5000 UNITS: 5000; 17000; 24000 CAPSULE, DELAYED RELEASE ORAL at 08:02

## 2025-03-04 RX ADMIN — SODIUM CHLORIDE, POTASSIUM CHLORIDE, SODIUM LACTATE AND CALCIUM CHLORIDE 500 ML: 600; 310; 30; 20 INJECTION, SOLUTION INTRAVENOUS at 14:52

## 2025-03-04 RX ADMIN — PANCRELIPASE LIPASE, PANCRELIPASE PROTEASE, PANCRELIPASE AMYLASE 20000 UNITS: 20000; 63000; 84000 CAPSULE, DELAYED RELEASE ORAL at 08:02

## 2025-03-04 RX ADMIN — Medication 1 TABLET: at 08:02

## 2025-03-04 RX ADMIN — TRAZODONE HYDROCHLORIDE 50 MG: 50 TABLET ORAL at 20:51

## 2025-03-04 RX ADMIN — SODIUM CHLORIDE, PRESERVATIVE FREE 10 ML: 5 INJECTION INTRAVENOUS at 20:53

## 2025-03-04 RX ADMIN — PANCRELIPASE LIPASE, PANCRELIPASE PROTEASE, PANCRELIPASE AMYLASE 20000 UNITS: 20000; 63000; 84000 CAPSULE, DELAYED RELEASE ORAL at 12:37

## 2025-03-04 RX ADMIN — TRAMADOL HYDROCHLORIDE 50 MG: 50 TABLET, COATED ORAL at 20:51

## 2025-03-04 RX ADMIN — SODIUM CHLORIDE, PRESERVATIVE FREE 10 ML: 5 INJECTION INTRAVENOUS at 08:05

## 2025-03-04 RX ADMIN — POTASSIUM CHLORIDE: 2 INJECTION, SOLUTION, CONCENTRATE INTRAVENOUS at 18:01

## 2025-03-04 RX ADMIN — ONDANSETRON 4 MG: 2 INJECTION, SOLUTION INTRAMUSCULAR; INTRAVENOUS at 12:37

## 2025-03-04 RX ADMIN — PANCRELIPASE LIPASE, PANCRELIPASE PROTEASE, PANCRELIPASE AMYLASE 5000 UNITS: 5000; 17000; 24000 CAPSULE, DELAYED RELEASE ORAL at 12:37

## 2025-03-04 RX ADMIN — HYDRALAZINE HYDROCHLORIDE 10 MG: 20 INJECTION INTRAMUSCULAR; INTRAVENOUS at 23:21

## 2025-03-04 RX ADMIN — ONDANSETRON 4 MG: 2 INJECTION, SOLUTION INTRAMUSCULAR; INTRAVENOUS at 00:35

## 2025-03-04 RX ADMIN — ONDANSETRON 4 MG: 2 INJECTION, SOLUTION INTRAMUSCULAR; INTRAVENOUS at 08:08

## 2025-03-04 RX ADMIN — PANTOPRAZOLE SODIUM 40 MG: 40 TABLET, DELAYED RELEASE ORAL at 05:03

## 2025-03-04 RX ADMIN — DIAZEPAM 2.5 MG: 5 TABLET ORAL at 20:51

## 2025-03-04 RX ADMIN — TRAMADOL HYDROCHLORIDE 50 MG: 50 TABLET, COATED ORAL at 12:37

## 2025-03-04 RX ADMIN — ROSUVASTATIN CALCIUM 10 MG: 10 TABLET, FILM COATED ORAL at 20:51

## 2025-03-04 RX ADMIN — OXYCODONE 5 MG: 5 TABLET ORAL at 08:02

## 2025-03-04 RX ADMIN — THIAMINE HYDROCHLORIDE 200 MG: 100 INJECTION, SOLUTION INTRAMUSCULAR; INTRAVENOUS at 14:43

## 2025-03-04 RX ADMIN — ONDANSETRON 4 MG: 4 TABLET, ORALLY DISINTEGRATING ORAL at 17:03

## 2025-03-04 RX ADMIN — TRAMADOL HYDROCHLORIDE 50 MG: 50 TABLET, COATED ORAL at 05:03

## 2025-03-04 ASSESSMENT — PAIN DESCRIPTION - LOCATION
LOCATION: CHEST

## 2025-03-04 ASSESSMENT — PAIN SCALES - GENERAL
PAINLEVEL_OUTOF10: 4
PAINLEVEL_OUTOF10: 7
PAINLEVEL_OUTOF10: 0
PAINLEVEL_OUTOF10: 2
PAINLEVEL_OUTOF10: 3
PAINLEVEL_OUTOF10: 3

## 2025-03-04 ASSESSMENT — PAIN DESCRIPTION - ORIENTATION
ORIENTATION: MID
ORIENTATION: MID

## 2025-03-04 ASSESSMENT — PAIN DESCRIPTION - DESCRIPTORS: DESCRIPTORS: ACHING

## 2025-03-04 NOTE — PROGRESS NOTES
believe both of these are contributing to her symptomatology and her weight loss.  I have told family about the same and they are also willing to have her cut back on the same.  Over the next few days plan is to treat any alcohol withdrawals and help her to get her off of alcohol.  Other rare possibilities such as carcinoid syndrome are very unlikely but I have ordered a 24-hour urine for 5-HIAA.  I discussed in detail with cardiology and they will convince that her episodes of sinus pauses are all related to vasovagal episodes with exaggerated response.  They are also not sure what is causing the vasovagal responses.  I  On examination  Patient comfortable appearing nontoxic.  Alert oriented not in any distress.  .  HR NSR in 90s.  Blood pressure within normal limits.  Somewhat uncomfortable appearing due to reported nausea. Did not notice significant tremulousness or sweating.   Afebrile.  Nontoxic-appearing.  No rashes.  CVS: S1-S2 are normally no murmurs appreciated.  RS bilateral equal air entry no abnormal breath sounds appreciated.  Abdomen soft: Nontender no organomegaly.  CNS: Nonfocal.    A&P:    #Recurrent sinus pauses:   Troponin is within normal limits.  No severo blocking agents.  Does have marijuana use which I counseled against.  I do not think this has caused this.  Also drinks daily alcohol.   However should not cause such prolonged sinus pauses.   Echo   \"Left Ventricle: Hyperdynamic left ventricular systolic function. EF by 2D Simpsons Biplane is 75%. Left ventricle size is normal. Increased wall thickness. Findings consistent with concentric hypertrophy. Normal wall motion.    Aorta: Normal sized aortic root. Ao root diameter is 3.6 cm. Mildly dilated ascending aorta. Ao ascending diameter is 3.8 cm.    Image quality is adequate.\"  Will defer to cardiology regarding workup of increased wall thickness.  She does have history of hypertension.  D/w cardio. No reversible causes and since pt had 2  the bedside nurses and the respiratory therapist.      NOTE OF PERSONAL INVOLVEMENT IN CARE   This patient has a high probability of imminent, clinically significant deterioration, which requires the highest level of preparedness to intervene urgently. I participated in the decision-making and personally managed or directed the management of the following life and organ supporting interventions that required my frequent assessment to treat or prevent imminent deterioration.    I personally spent 45 minutes of critical care time.  This is time spent at this critically ill patient's bedside actively involved in patient care as well as the coordination of care and discussions with the patient's family.  This does not include any procedural time which has been billed separately.  Eb Guillaume  [facility] critical care  3/3/2025

## 2025-03-04 NOTE — CONSULTS
Nutrition Education    Educated on gastroparesis diet  Learners: Patient and Family  Readiness: Eager  Method: Explanation, Handout, and Teachback  Response: Verbalizes Understanding  Contact name and number provided.    Re-consulted for diet edu. Gi noted gastroparesis and barium swallow with poor esophageal motility. Discussed diet for gastroparesis and ways to increase kcal throughout day. Still no measured weight this admission; patient uncomfortable and RD did not try to obtain bedscale today. Phos remains low. Tried ONS, willing to drink with encouragement.    Bambi Moralez MS, RD, [facility]  Ext: [phone], or via PerfectServe

## 2025-03-04 NOTE — CARE COORDINATION
Care Management Progress Note    Reason for Admission:   Lactic acidosis [E87.20]  Metabolic acidosis [E87.20]  Hypoglycemia [E16.2]  Hypothermia, initial encounter [T68.XXXA]  Other fatigue [R53.83]  Sinus pause [I45.5]  Procedure(s) (LRB):  Insert PPM dual (N/A)  1 Day Post-Op    Patient Admission Status: Inpatient  RUR: 12%  Hospitalization in the last 30 days (Readmission):  no        Transition of care plan:  [Medical]  Medtronic dual chamber pacemaker placed yesterday  Vasovagal response  Per IDR discussion:  Full liquids  Lidocaine patch for pain management  PT/OT consultations  EP following  [DC plan]  Once PT/OT have evaluated pt,case management will discuss recommendations with pt.  Pt and family do not anticipate any needs as pt was independent prior to admission and hiking with family.  Discharge plan communicated with patient and/or discharge caregiver: ongoing     Date 1st IMM letter given: 3/4/25  Outpatient follow-up.cardiology /EP  Transport at discharge:  family      Mallory Luna RN

## 2025-03-04 NOTE — PROGRESS NOTES
attended rounds in the ICU as part of the Interdisciplinary team where the patient's ongoing care was discussed. Please contact [facility] for further referrals.    aGrcía Case MDiv  Staff   Paging Service [phone] (SHON)

## 2025-03-04 NOTE — PROGRESS NOTES
[facility]  [address] [place], [place] [address] [phone]    [facility] Adult  Hospitalist Group                                                                                          Hospitalist Progress Note  Kusum Roper MD        Date of Service:  3/4/2025  NAME:  Valeria Lo  :  1950  MRN:  [id number]      Interval history / Subjective:   Patient is being transferred back to hospitalist service after being evaluated and treated in the ICU   She states she still is unable to eat much and is currently on a liquid diet  Assessment & Plan:     Syncope and sustained sinus pauses  -Patient had multiple code blues early morning 3/3 which was eventually thought to be sustained sinus pauses  -Telemetry demonstrated evidence of vasovagal response with gradual slowing of her sinus rate followed by junctional rhythm and prolonged pauses of unclear precipitating factors  -Cardiology evaluated and placed a pacemaker on 3/3.  Cardiology suggest underlying GI pathology may be a trigger?  -Echo with preserved EF and normal wall motion    Unintentional weight loss  Chronic nausea  -CT chest abdomen pelvis with IV and oral contrast without any acute findings  -She was started on Creon for presumed pancreatic insufficiency by her PCP.  CT abdomen and pelvis suggests chronic calcific pancreatitis  -she had a previous abnormal gastric emptying study, however she is not a great candidate for Reglan or erythromycin given above issues  -GI evaluated, plan for scopes tomorrow    Severe hypoglycemia  Severe hypothermia  Severe lactic acidosis  -Lactic acid now normalized  -Blood sugars improved with fluids  -Off Mingo hugger    Rule out carcinoid  -Intensivist has ordered 24-hour urine for 5-HIAA and metanephrines  -Follow-up results    History of alcohol abuse  -Will place on CIWA    Hypertension  Hyperlipidemia  -Resume losartan, Crestor    Hypokalemia  -Repleted  effervescent tablet 40 mEq  40 mEq Oral PRN    Or    potassium chloride 10 mEq/100 mL IVPB (Peripheral Line)  10 mEq IntraVENous PRN    magnesium sulfate 2000 mg in 50 mL IVPB premix  2,000 mg IntraVENous PRN    ondansetron (ZOFRAN-ODT) disintegrating tablet 4 mg  4 mg Oral Q8H PRN    Or    ondansetron (ZOFRAN) injection 4 mg  4 mg IntraVENous Q6H PRN    polyethylene glycol (GLYCOLAX) packet 17 g  17 g Oral Daily PRN    acetaminophen (TYLENOL) tablet 650 mg  650 mg Oral Q6H PRN    Or    acetaminophen (TYLENOL) suppository 650 mg  650 mg Rectal Q6H PRN    diatrizoate meglumine-sodium (GASTROGRAFIN) 66-10 % solution 30 mL  30 mL Oral ONCE PRN    lipase-protease-amylase (ZENPEP) delayed release capsule 5,000 Units  5,000 Units Oral TID WC    lipase-protease-amylase (ZENPEP) 14509-70631 units delayed release capsule 20,000 Units  20,000 Units Oral TID WC    traZODone (DESYREL) tablet 50 mg  50 mg Oral Nightly    pantoprazole (PROTONIX) tablet 40 mg  40 mg Oral QAM AC    rosuvastatin (CRESTOR) tablet 10 mg  10 mg Oral Nightly    antioxidant multivitamin (OCUVITE) tablet  1 tablet Oral Daily     ______________________________________________________________________  EXPECTED LENGTH OF STAY:    ACTUAL LENGTH OF STAY:          1                 Kusum Roper MD

## 2025-03-04 NOTE — PROGRESS NOTES
5,000 Units, 5,000 Units, Oral, TID Jacquelin GROVER Maitri S, MD, 5,000 Units at 03/04/25 0802    lipase-protease-amylase (ZENPEP) 99008-88163 units delayed release capsule 20,000 Units, 20,000 Units, Oral, TID Jacquelin GROVER Maitri S, MD, 20,000 Units at 03/04/25 0802    traZODone (DESYREL) tablet 50 mg, 50 mg, Oral, Nightly, Kusum Roper MD, 50 mg at 03/03/25 1954    pantoprazole (PROTONIX) tablet 40 mg, 40 mg, Oral, QAM AC, Ksuum Roper MD, 40 mg at 03/04/25 0503    rosuvastatin (CRESTOR) tablet 10 mg, 10 mg, Oral, Nightly, Kusum Roper MD, 10 mg at 03/03/25 1954    antioxidant multivitamin (OCUVITE) tablet, 1 tablet, Oral, Daily, Kusum Roper MD, 1 tablet at 03/04/25 0802    Freddy Sanford MD  [facility] [address], [address]  [place], [place] [address]  [phone]      CC:Beatris Vick MD

## 2025-03-04 NOTE — PROGRESS NOTES
Music Therapy Assessment  [facility]    Valeria JORGE [facility] [id number]     1950  74 y.o.  female    Patient Telephone Number: [phone] (home)   Restorationism Affiliation: Other   Language: English   Patient Active Problem List    Diagnosis Date Noted    Sinus pause 03/03/2025    Vasovagal syncope 03/03/2025    Lactic acidosis 03/02/2025    Osteoporosis 08/23/2023    Calcaneus fracture 04/23/2013        Date: 3/4/2025            Total Time Calculated: 30 min          [facility] A4 INTENSIVE CARE UNIT    Mental Status:   [x] Alert [  ] Forgetful [  ]  Confused  [  ] Minimally responsive  [  ] Sleeping    Session Observations:  Referred by Chaplain Eric; this music therapist (MT) approached the patient's (pt) room and observed a Physician's Assistance (PA) actively entering the space to meet with the pt. MT waited in the hallway, in hopes to offer a visit once the PA concluded theirs. Unfortunately, due to limited time, this MT needed to conclude the attempted visit and exited briefly. Upon return this MT peered into the pt's room and observed her lying in bed with 2 family(?) members present. MT introduced self/role and asked how they were feeling at this time. Pt's loved ones responded to this and declined a visit to allow space for the pt to rest. MT expressed understanding and asked if they had any additional needs. Pt's loved ones declined this. MT exited.    WILMER Gramajo (Music Therapist, Board Certified)  Spiritual Health Department  Referral-Based Services

## 2025-03-04 NOTE — PLAN OF CARE
Problem: Physical Therapy - Adult  Goal: By Discharge: Performs mobility at highest level of function for planned discharge setting.  See evaluation for individualized goals.  Description: FUNCTIONAL STATUS PRIOR TO ADMISSION: Patient was independent and active without use of DME.    HOME SUPPORT PRIOR TO ADMISSION: The patient lived with her  and has a local supportive daughter.     Physical Therapy Goals  Initiated 3/4/2025  1.  Patient will move from supine to sit and sit to supine in bed with modified independence within 7 day(s).    2.  Patient will perform sit to stand with supervision/set-up within 7 day(s).  3.  Patient will transfer from bed to chair and chair to bed with independence using the least restrictive device within 7 day(s).  4.  Patient will ambulate with independence for 150 feet with the least restrictive device within 7 day(s).   5.  Patient will ascend/descend 4 stairs with 1 handrail(s) with modified independence within 7 day(s).  6.  Patient will state and functionally follow PPM precautions during functional activity without cues within 7 days.    Outcome: Progressing     PHYSICAL THERAPY EVALUATION    Patient: Valeria Lo (74 y.o. female)  Date: 3/4/2025  Primary Diagnosis: Lactic acidosis [E87.20]  Metabolic acidosis [E87.20]  Hypoglycemia [E16.2]  Hypothermia, initial encounter [T68.XXXA]  Other fatigue [R53.83]  Sinus pause [I45.5]  Procedure(s) (LRB):  Insert PPM dual (N/A) 1 Day Post-Op   Precautions: Restrictions/Precautions  Restrictions/Precautions:  (PPM on left)            ASSESSMENT :   DEFICITS/IMPAIRMENTS:   The patient is limited by decreased functional mobility, body mechanics, activity tolerance, balance, and c/o dizziness with activity      Based on the impairments listed above, the patient presents below her functional baseline following admission for AMS, hypokalemia, hypothermia, and severe hypoglycemia. She has a history of persistent nausea and workup

## 2025-03-05 LAB
ALBUMIN SERPL-MCNC: 2.9 G/DL (ref 3.5–5)
ALBUMIN/GLOB SERPL: 1 (ref 1.1–2.2)
ALP SERPL-CCNC: 59 U/L (ref 45–117)
ALT SERPL-CCNC: 15 U/L (ref 12–78)
ANION GAP SERPL CALC-SCNC: 5 MMOL/L (ref 2–12)
AST SERPL-CCNC: 17 U/L (ref 15–37)
BILIRUB SERPL-MCNC: 0.8 MG/DL (ref 0.2–1)
BUN SERPL-MCNC: 7 MG/DL (ref 6–20)
BUN/CREAT SERPL: 10 (ref 12–20)
CALCIUM SERPL-MCNC: 9.3 MG/DL (ref 8.5–10.1)
CHLORIDE SERPL-SCNC: 107 MMOL/L (ref 97–108)
CO2 SERPL-SCNC: 27 MMOL/L (ref 21–32)
CREAT SERPL-MCNC: 0.67 MG/DL (ref 0.55–1.02)
ERYTHROCYTE [DISTWIDTH] IN BLOOD BY AUTOMATED COUNT: 13.2 % (ref 11.5–14.5)
GLOBULIN SER CALC-MCNC: 2.9 G/DL (ref 2–4)
GLUCOSE BLD STRIP.AUTO-MCNC: 93 MG/DL (ref 65–117)
GLUCOSE SERPL-MCNC: 118 MG/DL (ref 65–100)
HCT VFR BLD AUTO: 29.4 % (ref 35–47)
HGB BLD-MCNC: 9.9 G/DL (ref 11.5–16)
MAGNESIUM SERPL-MCNC: 2.1 MG/DL (ref 1.6–2.4)
MCH RBC QN AUTO: 30.6 PG (ref 26–34)
MCHC RBC AUTO-ENTMCNC: 33.7 G/DL (ref 30–36.5)
MCV RBC AUTO: 90.7 FL (ref 80–99)
NRBC # BLD: 0 K/UL (ref 0–0.01)
NRBC BLD-RTO: 0 PER 100 WBC
PLATELET # BLD AUTO: 155 K/UL (ref 150–400)
PMV BLD AUTO: 9.4 FL (ref 8.9–12.9)
POTASSIUM SERPL-SCNC: 3.8 MMOL/L (ref 3.5–5.1)
PROT SERPL-MCNC: 5.8 G/DL (ref 6.4–8.2)
RBC # BLD AUTO: 3.24 M/UL (ref 3.8–5.2)
SERVICE CMNT-IMP: NORMAL
SODIUM SERPL-SCNC: 139 MMOL/L (ref 136–145)
WBC # BLD AUTO: 7.8 K/UL (ref 3.6–11)

## 2025-03-05 PROCEDURE — 6360000002 HC RX W HCPCS: Performed by: FAMILY MEDICINE

## 2025-03-05 PROCEDURE — 6370000000 HC RX 637 (ALT 250 FOR IP): Performed by: HOSPITALIST

## 2025-03-05 PROCEDURE — 2500000003 HC RX 250 WO HCPCS: Performed by: FAMILY MEDICINE

## 2025-03-05 PROCEDURE — 6370000000 HC RX 637 (ALT 250 FOR IP): Performed by: FAMILY MEDICINE

## 2025-03-05 PROCEDURE — 2580000003 HC RX 258: Performed by: HOSPITALIST

## 2025-03-05 PROCEDURE — 97116 GAIT TRAINING THERAPY: CPT

## 2025-03-05 PROCEDURE — 6360000002 HC RX W HCPCS: Performed by: HOSPITALIST

## 2025-03-05 PROCEDURE — 95717 EEG PHYS/QHP 2-12 HR W/O VID: CPT | Performed by: PSYCHIATRY & NEUROLOGY

## 2025-03-05 PROCEDURE — 83735 ASSAY OF MAGNESIUM: CPT

## 2025-03-05 PROCEDURE — 83497 ASSAY OF 5-HIAA: CPT

## 2025-03-05 PROCEDURE — 80053 COMPREHEN METABOLIC PANEL: CPT

## 2025-03-05 PROCEDURE — 82962 GLUCOSE BLOOD TEST: CPT

## 2025-03-05 PROCEDURE — 97530 THERAPEUTIC ACTIVITIES: CPT

## 2025-03-05 PROCEDURE — 83835 ASSAY OF METANEPHRINES: CPT

## 2025-03-05 PROCEDURE — 36415 COLL VENOUS BLD VENIPUNCTURE: CPT

## 2025-03-05 PROCEDURE — 81050 URINALYSIS VOLUME MEASURE: CPT

## 2025-03-05 PROCEDURE — 2060000000 HC ICU INTERMEDIATE R&B

## 2025-03-05 PROCEDURE — 2500000003 HC RX 250 WO HCPCS: Performed by: HOSPITALIST

## 2025-03-05 PROCEDURE — 6370000000 HC RX 637 (ALT 250 FOR IP): Performed by: NURSE PRACTITIONER

## 2025-03-05 PROCEDURE — 85027 COMPLETE CBC AUTOMATED: CPT

## 2025-03-05 RX ORDER — POLYETHYLENE GLYCOL 3350 17 G/17G
17 POWDER, FOR SOLUTION ORAL
Status: COMPLETED | OUTPATIENT
Start: 2025-03-05 | End: 2025-03-05

## 2025-03-05 RX ORDER — I-VITE, TAB 1000-60-2MG (60/BT) 300MCG-200
1 TAB ORAL DAILY
Status: DISCONTINUED | OUTPATIENT
Start: 2025-03-05 | End: 2025-03-07 | Stop reason: HOSPADM

## 2025-03-05 RX ORDER — POLYETHYLENE GLYCOL 3350 17 G/17G
17 POWDER, FOR SOLUTION ORAL
Status: COMPLETED | OUTPATIENT
Start: 2025-03-06 | End: 2025-03-06

## 2025-03-05 RX ADMIN — POLYETHYLENE GLYCOL 3350 17 G: 17 POWDER, FOR SOLUTION ORAL at 20:58

## 2025-03-05 RX ADMIN — PANTOPRAZOLE SODIUM 40 MG: 40 TABLET, DELAYED RELEASE ORAL at 05:00

## 2025-03-05 RX ADMIN — PANCRELIPASE LIPASE, PANCRELIPASE PROTEASE, PANCRELIPASE AMYLASE 40000 UNITS: 20000; 63000; 84000 CAPSULE, DELAYED RELEASE ORAL at 08:36

## 2025-03-05 RX ADMIN — ENOXAPARIN SODIUM 40 MG: 100 INJECTION SUBCUTANEOUS at 20:46

## 2025-03-05 RX ADMIN — POLYETHYLENE GLYCOL 3350 17 G: 17 POWDER, FOR SOLUTION ORAL at 20:52

## 2025-03-05 RX ADMIN — PANCRELIPASE LIPASE, PANCRELIPASE PROTEASE, PANCRELIPASE AMYLASE 40000 UNITS: 20000; 63000; 84000 CAPSULE, DELAYED RELEASE ORAL at 12:13

## 2025-03-05 RX ADMIN — ROSUVASTATIN CALCIUM 10 MG: 10 TABLET, FILM COATED ORAL at 20:45

## 2025-03-05 RX ADMIN — SODIUM CHLORIDE, PRESERVATIVE FREE 10 ML: 5 INJECTION INTRAVENOUS at 20:47

## 2025-03-05 RX ADMIN — THIAMINE HYDROCHLORIDE 200 MG: 100 INJECTION, SOLUTION INTRAMUSCULAR; INTRAVENOUS at 20:46

## 2025-03-05 RX ADMIN — Medication 1 TABLET: at 09:10

## 2025-03-05 RX ADMIN — PANCRELIPASE LIPASE, PANCRELIPASE PROTEASE, PANCRELIPASE AMYLASE 40000 UNITS: 20000; 63000; 84000 CAPSULE, DELAYED RELEASE ORAL at 16:58

## 2025-03-05 RX ADMIN — ACETAMINOPHEN 650 MG: 325 TABLET ORAL at 20:45

## 2025-03-05 RX ADMIN — POLYETHYLENE GLYCOL 3350 17 G: 17 POWDER, FOR SOLUTION ORAL at 20:57

## 2025-03-05 RX ADMIN — DIAZEPAM 5 MG: 5 INJECTION, SOLUTION INTRAMUSCULAR; INTRAVENOUS at 18:12

## 2025-03-05 RX ADMIN — POLYETHYLENE GLYCOL 3350 17 G: 17 POWDER, FOR SOLUTION ORAL at 20:51

## 2025-03-05 RX ADMIN — PANTOPRAZOLE SODIUM 40 MG: 40 TABLET, DELAYED RELEASE ORAL at 08:26

## 2025-03-05 RX ADMIN — LOSARTAN POTASSIUM 100 MG: 50 TABLET, FILM COATED ORAL at 08:26

## 2025-03-05 RX ADMIN — POLYETHYLENE GLYCOL 3350 17 G: 17 POWDER, FOR SOLUTION ORAL at 20:56

## 2025-03-05 RX ADMIN — DIAZEPAM 2.5 MG: 5 TABLET ORAL at 14:10

## 2025-03-05 RX ADMIN — POLYETHYLENE GLYCOL 3350 17 G: 17 POWDER, FOR SOLUTION ORAL at 20:50

## 2025-03-05 RX ADMIN — DIAZEPAM 2.5 MG: 5 TABLET ORAL at 08:26

## 2025-03-05 RX ADMIN — TRAMADOL HYDROCHLORIDE 50 MG: 50 TABLET, COATED ORAL at 04:55

## 2025-03-05 RX ADMIN — THIAMINE HYDROCHLORIDE 200 MG: 100 INJECTION, SOLUTION INTRAMUSCULAR; INTRAVENOUS at 04:53

## 2025-03-05 RX ADMIN — TRAZODONE HYDROCHLORIDE 50 MG: 50 TABLET ORAL at 20:46

## 2025-03-05 RX ADMIN — POTASSIUM CHLORIDE: 2 INJECTION, SOLUTION, CONCENTRATE INTRAVENOUS at 04:50

## 2025-03-05 RX ADMIN — Medication 1 TABLET: at 09:55

## 2025-03-05 RX ADMIN — POLYETHYLENE GLYCOL 3350 17 G: 17 POWDER, FOR SOLUTION ORAL at 20:53

## 2025-03-05 RX ADMIN — SODIUM CHLORIDE, PRESERVATIVE FREE 10 ML: 5 INJECTION INTRAVENOUS at 20:46

## 2025-03-05 RX ADMIN — PROCHLORPERAZINE EDISYLATE 5 MG: 5 INJECTION INTRAMUSCULAR; INTRAVENOUS at 00:18

## 2025-03-05 RX ADMIN — THIAMINE HYDROCHLORIDE 200 MG: 100 INJECTION, SOLUTION INTRAMUSCULAR; INTRAVENOUS at 14:11

## 2025-03-05 RX ADMIN — POLYETHYLENE GLYCOL 3350 17 G: 17 POWDER, FOR SOLUTION ORAL at 20:55

## 2025-03-05 RX ADMIN — DIAZEPAM 2.5 MG: 5 TABLET ORAL at 20:45

## 2025-03-05 ASSESSMENT — PAIN DESCRIPTION - LOCATION: LOCATION: STERNUM

## 2025-03-05 ASSESSMENT — PAIN SCALES - GENERAL
PAINLEVEL_OUTOF10: 5
PAINLEVEL_OUTOF10: 5
PAINLEVEL_OUTOF10: 0
PAINLEVEL_OUTOF10: 0

## 2025-03-05 NOTE — PLAN OF CARE
Problem: Physical Therapy - Adult  Goal: By Discharge: Performs mobility at highest level of function for planned discharge setting.  See evaluation for individualized goals.  Description: FUNCTIONAL STATUS PRIOR TO ADMISSION: Patient was independent and active without use of DME.    HOME SUPPORT PRIOR TO ADMISSION: The patient lived with her  and has a local supportive daughter.     Physical Therapy Goals  Initiated 3/4/2025  1.  Patient will move from supine to sit and sit to supine in bed with modified independence within 7 day(s).    2.  Patient will perform sit to stand with supervision/set-up within 7 day(s).  3.  Patient will transfer from bed to chair and chair to bed with independence using the least restrictive device within 7 day(s).  4.  Patient will ambulate with independence for 150 feet with the least restrictive device within 7 day(s).   5.  Patient will ascend/descend 4 stairs with 1 handrail(s) with modified independence within 7 day(s).  6.  Patient will state and functionally follow PPM precautions during functional activity without cues within 7 days.    Outcome: Progressing   PHYSICAL THERAPY TREATMENT    Patient: Valeria Lo (74 y.o. female)  Date: 3/5/2025  Diagnosis: Lactic acidosis [E87.20]  Metabolic acidosis [E87.20]  Hypoglycemia [E16.2]  Hypothermia, initial encounter [T68.XXXA]  Other fatigue [R53.83]  Sinus pause [I45.5] Lactic acidosis  Procedure(s) (LRB):  Insert PPM dual (N/A) 2 Days Post-Op  Precautions: Restrictions/Precautions  Restrictions/Precautions:  (PPM on left)            ASSESSMENT:  Patient continues to benefit from skilled PT services and is progressing towards goals. Nausea and dizziness were well controlled this date to allow for good activity progression. She remained challenged with following PPM precautions during bed mobility and transfers but demonstrated improvement following cues. Gait training completed x75' using a RW requiring CGA this date  Exercise Program;Fall Prevention Strategies;Transfer Training  Education Method: Verbal  Barriers to Learning: None  Education Outcome: Verbalized understanding      Joel Balderas, PT  Minutes: 23

## 2025-03-05 NOTE — PROCEDURES
MD    traZODone (DESYREL) tablet 50 mg, 50 mg, Oral, Nightly, Kusum Roper MD, 50 mg at 03/04/25 2051    pantoprazole (PROTONIX) tablet 40 mg, 40 mg, Oral, QAM AC, Kusum Roper MD, 40 mg at 03/05/25 0826    rosuvastatin (CRESTOR) tablet 10 mg, 10 mg, Oral, Nightly, Kusum Roper MD, 10 mg at 03/04/25 2051

## 2025-03-05 NOTE — CARE COORDINATION
3/5/25  10:59 AM    Care Management Progress Note    Reason for Admission:   Lactic acidosis [E87.20]  Metabolic acidosis [E87.20]  Hypoglycemia [E16.2]  Hypothermia, initial encounter [T68.XXXA]  Other fatigue [R53.83]  Sinus pause [I45.5]  Procedure(s) (LRB):  Insert PPM dual (N/A)  2 Days Post-Op    Patient Admission Status: Inpatient  RUR: 11%  Hospitalization in the last 30 days (Readmission):  No        Transition of care plan:  Ongoing medical management  Pacemaker placed on 3/3/25  PT/OT consulted and are continuing to assess for dc recs.   Neurology consulted  Discharge plan:  CM following for dc needs- waiting on PT/OT recs.    Discharge plan communicated with patient and/or discharge caregiver: No    Date 1st IMM letter given: 3/4/25  Outpatient follow-up.  Transport at discharge: Family Ayesha Goel MSW  [facility] [place] [facility] [place]      Available via [employer]

## 2025-03-05 NOTE — PROGRESS NOTES
premix  2,000 mg IntraVENous PRN    ondansetron (ZOFRAN-ODT) disintegrating tablet 4 mg  4 mg Oral Q8H PRN    Or    ondansetron (ZOFRAN) injection 4 mg  4 mg IntraVENous Q6H PRN    polyethylene glycol (GLYCOLAX) packet 17 g  17 g Oral Daily PRN    acetaminophen (TYLENOL) tablet 650 mg  650 mg Oral Q6H PRN    Or    acetaminophen (TYLENOL) suppository 650 mg  650 mg Rectal Q6H PRN    diatrizoate meglumine-sodium (GASTROGRAFIN) 66-10 % solution 30 mL  30 mL Oral ONCE PRN    traZODone (DESYREL) tablet 50 mg  50 mg Oral Nightly    pantoprazole (PROTONIX) tablet 40 mg  40 mg Oral QAM AC    rosuvastatin (CRESTOR) tablet 10 mg  10 mg Oral Nightly     ______________________________________________________________________  EXPECTED LENGTH OF STAY: 5  ACTUAL LENGTH OF STAY:          2                 Saroj Newman MD

## 2025-03-05 NOTE — PLAN OF CARE
Problem: Safety - Adult  Goal: Free from fall injury  Outcome: Progressing  Flowsheets (Taken 3/4/2025 1900 by Luly Cameron RN)  Free From Fall Injury: Instruct family/caregiver on patient safety     Problem: Physical Therapy - Adult  Goal: By Discharge: Performs mobility at highest level of function for planned discharge setting.  See evaluation for individualized goals.  Description: FUNCTIONAL STATUS PRIOR TO ADMISSION: Patient was independent and active without use of DME.    HOME SUPPORT PRIOR TO ADMISSION: The patient lived with her  and has a local supportive daughter.     Physical Therapy Goals  Initiated 3/4/2025  1.  Patient will move from supine to sit and sit to supine in bed with modified independence within 7 day(s).    2.  Patient will perform sit to stand with supervision/set-up within 7 day(s).  3.  Patient will transfer from bed to chair and chair to bed with independence using the least restrictive device within 7 day(s).  4.  Patient will ambulate with independence for 150 feet with the least restrictive device within 7 day(s).   5.  Patient will ascend/descend 4 stairs with 1 handrail(s) with modified independence within 7 day(s).  6.  Patient will state and functionally follow PPM precautions during functional activity without cues within 7 days.    3/4/2025 1731 by Joel Balderas, PT  Outcome: Progressing

## 2025-03-05 NOTE — PROGRESS NOTES
[facility]             [facility]        NAME: Valeria Lo   :  1950   MRN:  [id number]       Subjective:   Feels ok today- nausea with liquid diet but able to keep more down than solids. Review of critical care note mentions daily alcohol use. Lactic acidosis and hypoglycemia improved    Cardiology notes ok to use reglan or erythromycin if needed from cardio standpoint     She has had 1 year of nausea, vomiting, cyclical diarrhea and constipation, poor PO intake     GES showing gastroparesis  Barium swallow with poor esophageal motility  Planned for egd/colonoscopy outpatient     CT c/abd/pelv chronic pancreatitis otherwise no acute findings  Objective:   Hgb stable      VITALS:   Last 24hrs VS reviewed since prior progress note. Most recent are:  Vitals:    25 1600   BP: 138/77   Pulse:    Resp:    Temp: 98.4 °F (36.9 °C)   SpO2:        Intake/Output Summary (Last 24 hours) at 3/5/2025 1630  Last data filed at 3/5/2025 1500  Gross per 24 hour   Intake 2464.23 ml   Output 3450 ml   Net -985.77 ml       PHYSICAL EXAM:  General: Alert, in no acute distress    HEENT: Anicteric sclerae.  Lungs:            CTA Bilaterally.   Heart:  Regular  rhythm,    Abdomen: Soft, Non distended, Non tender.  (+)Bowel sounds, no HSM  Extremities: No c/c/e  Neurologic:  CN 2-12 gi, Alert and oriented X 3.  No acute neurological distress   Psych:   Good insight. Not anxious nor agitated.    Lab Data Reviewed:   Recent Labs     25  0504 25  033   WBC 7.5 7.8   HGB 10.2* 9.9*   HCT 30.7* 29.4*    155     Recent Labs     25  0358 25  0504 25  0337    140 139   K 4.1 3.5 3.8   * 110* 107   CO2 19* 24 27   BUN 11 5* 7   PHOS 1.9*  1.9* 2.4*  --      Recent Labs     25  0358 25  0337   GLOB 3.0 2.9       ________________________________________________________________________  Patient Active Problem List   Diagnosis    Calcaneus

## 2025-03-06 PROBLEM — E43 SEVERE PROTEIN-CALORIE MALNUTRITION: Status: ACTIVE | Noted: 2025-03-06

## 2025-03-06 LAB
ALBUMIN SERPL-MCNC: 2.9 G/DL (ref 3.5–5)
ALBUMIN/GLOB SERPL: 1 (ref 1.1–2.2)
ALP SERPL-CCNC: 58 U/L (ref 45–117)
ALT SERPL-CCNC: 17 U/L (ref 12–78)
ANION GAP SERPL CALC-SCNC: 5 MMOL/L (ref 2–12)
AST SERPL-CCNC: 21 U/L (ref 15–37)
BILIRUB SERPL-MCNC: 0.9 MG/DL (ref 0.2–1)
BUN SERPL-MCNC: 9 MG/DL (ref 6–20)
BUN/CREAT SERPL: 12 (ref 12–20)
CALCIUM SERPL-MCNC: 9.8 MG/DL (ref 8.5–10.1)
CHLORIDE SERPL-SCNC: 104 MMOL/L (ref 97–108)
CO2 SERPL-SCNC: 29 MMOL/L (ref 21–32)
CREAT SERPL-MCNC: 0.77 MG/DL (ref 0.55–1.02)
ERYTHROCYTE [DISTWIDTH] IN BLOOD BY AUTOMATED COUNT: 13.2 % (ref 11.5–14.5)
GLOBULIN SER CALC-MCNC: 2.9 G/DL (ref 2–4)
GLUCOSE BLD STRIP.AUTO-MCNC: 93 MG/DL (ref 65–117)
GLUCOSE SERPL-MCNC: 87 MG/DL (ref 65–100)
HCT VFR BLD AUTO: 30.2 % (ref 35–47)
HGB BLD-MCNC: 9.9 G/DL (ref 11.5–16)
MCH RBC QN AUTO: 30.3 PG (ref 26–34)
MCHC RBC AUTO-ENTMCNC: 32.8 G/DL (ref 30–36.5)
MCV RBC AUTO: 92.4 FL (ref 80–99)
NRBC # BLD: 0 K/UL (ref 0–0.01)
NRBC BLD-RTO: 0 PER 100 WBC
PLATELET # BLD AUTO: 176 K/UL (ref 150–400)
PMV BLD AUTO: 10.2 FL (ref 8.9–12.9)
POTASSIUM SERPL-SCNC: 4.2 MMOL/L (ref 3.5–5.1)
PROT SERPL-MCNC: 5.8 G/DL (ref 6.4–8.2)
RBC # BLD AUTO: 3.27 M/UL (ref 3.8–5.2)
SERVICE CMNT-IMP: NORMAL
SODIUM SERPL-SCNC: 138 MMOL/L (ref 136–145)
WBC # BLD AUTO: 5.2 K/UL (ref 3.6–11)

## 2025-03-06 PROCEDURE — 6370000000 HC RX 637 (ALT 250 FOR IP): Performed by: FAMILY MEDICINE

## 2025-03-06 PROCEDURE — 2580000003 HC RX 258: Performed by: HOSPITALIST

## 2025-03-06 PROCEDURE — 80053 COMPREHEN METABOLIC PANEL: CPT

## 2025-03-06 PROCEDURE — 2060000000 HC ICU INTERMEDIATE R&B

## 2025-03-06 PROCEDURE — 6370000000 HC RX 637 (ALT 250 FOR IP): Performed by: NURSE PRACTITIONER

## 2025-03-06 PROCEDURE — 6360000002 HC RX W HCPCS: Performed by: HOSPITALIST

## 2025-03-06 PROCEDURE — 82962 GLUCOSE BLOOD TEST: CPT

## 2025-03-06 PROCEDURE — 2500000003 HC RX 250 WO HCPCS: Performed by: FAMILY MEDICINE

## 2025-03-06 PROCEDURE — 6370000000 HC RX 637 (ALT 250 FOR IP): Performed by: HOSPITALIST

## 2025-03-06 PROCEDURE — 36415 COLL VENOUS BLD VENIPUNCTURE: CPT

## 2025-03-06 PROCEDURE — 2500000003 HC RX 250 WO HCPCS: Performed by: HOSPITALIST

## 2025-03-06 PROCEDURE — 94761 N-INVAS EAR/PLS OXIMETRY MLT: CPT

## 2025-03-06 PROCEDURE — 85027 COMPLETE CBC AUTOMATED: CPT

## 2025-03-06 PROCEDURE — 6360000002 HC RX W HCPCS: Performed by: FAMILY MEDICINE

## 2025-03-06 RX ORDER — MORPHINE SULFATE 4 MG/ML
1 INJECTION, SOLUTION INTRAMUSCULAR; INTRAVENOUS EVERY 4 HOURS PRN
Status: DISCONTINUED | OUTPATIENT
Start: 2025-03-06 | End: 2025-03-07 | Stop reason: HOSPADM

## 2025-03-06 RX ADMIN — SODIUM CHLORIDE, PRESERVATIVE FREE 10 ML: 5 INJECTION INTRAVENOUS at 21:24

## 2025-03-06 RX ADMIN — POLYETHYLENE GLYCOL 3350 17 G: 17 POWDER, FOR SOLUTION ORAL at 04:49

## 2025-03-06 RX ADMIN — ROSUVASTATIN CALCIUM 10 MG: 10 TABLET, FILM COATED ORAL at 21:20

## 2025-03-06 RX ADMIN — POLYETHYLENE GLYCOL 3350 17 G: 17 POWDER, FOR SOLUTION ORAL at 04:53

## 2025-03-06 RX ADMIN — SODIUM CHLORIDE, PRESERVATIVE FREE 10 ML: 5 INJECTION INTRAVENOUS at 08:52

## 2025-03-06 RX ADMIN — DIAZEPAM 2.5 MG: 5 TABLET ORAL at 15:29

## 2025-03-06 RX ADMIN — MORPHINE SULFATE 1 MG: 4 INJECTION INTRAVENOUS at 21:34

## 2025-03-06 RX ADMIN — TRAZODONE HYDROCHLORIDE 50 MG: 50 TABLET ORAL at 21:20

## 2025-03-06 RX ADMIN — POLYETHYLENE GLYCOL 3350 17 G: 17 POWDER, FOR SOLUTION ORAL at 04:50

## 2025-03-06 RX ADMIN — MORPHINE SULFATE 1 MG: 4 INJECTION INTRAVENOUS at 15:29

## 2025-03-06 RX ADMIN — DIAZEPAM 2.5 MG: 5 TABLET ORAL at 08:50

## 2025-03-06 RX ADMIN — LOSARTAN POTASSIUM 100 MG: 50 TABLET, FILM COATED ORAL at 08:51

## 2025-03-06 RX ADMIN — THIAMINE HYDROCHLORIDE 200 MG: 100 INJECTION, SOLUTION INTRAMUSCULAR; INTRAVENOUS at 04:54

## 2025-03-06 RX ADMIN — DIAZEPAM 2.5 MG: 5 TABLET ORAL at 21:20

## 2025-03-06 RX ADMIN — PANCRELIPASE LIPASE, PANCRELIPASE PROTEASE, PANCRELIPASE AMYLASE 40000 UNITS: 20000; 63000; 84000 CAPSULE, DELAYED RELEASE ORAL at 08:50

## 2025-03-06 RX ADMIN — POTASSIUM CHLORIDE: 2 INJECTION, SOLUTION, CONCENTRATE INTRAVENOUS at 18:47

## 2025-03-06 RX ADMIN — PANCRELIPASE LIPASE, PANCRELIPASE PROTEASE, PANCRELIPASE AMYLASE 40000 UNITS: 20000; 63000; 84000 CAPSULE, DELAYED RELEASE ORAL at 18:46

## 2025-03-06 RX ADMIN — ENOXAPARIN SODIUM 40 MG: 100 INJECTION SUBCUTANEOUS at 21:20

## 2025-03-06 RX ADMIN — Medication 1 TABLET: at 18:25

## 2025-03-06 RX ADMIN — POLYETHYLENE GLYCOL 3350 17 G: 17 POWDER, FOR SOLUTION ORAL at 04:51

## 2025-03-06 RX ADMIN — POLYETHYLENE GLYCOL 3350, SODIUM SULFATE ANHYDROUS, SODIUM BICARBONATE, SODIUM CHLORIDE, POTASSIUM CHLORIDE 4000 ML: 236; 22.74; 6.74; 5.86; 2.97 POWDER, FOR SOLUTION ORAL at 18:49

## 2025-03-06 RX ADMIN — PANCRELIPASE LIPASE, PANCRELIPASE PROTEASE, PANCRELIPASE AMYLASE 40000 UNITS: 20000; 63000; 84000 CAPSULE, DELAYED RELEASE ORAL at 15:28

## 2025-03-06 ASSESSMENT — PAIN DESCRIPTION - LOCATION
LOCATION: CHEST
LOCATION: CHEST

## 2025-03-06 ASSESSMENT — PAIN DESCRIPTION - DESCRIPTORS
DESCRIPTORS: DISCOMFORT
DESCRIPTORS: DISCOMFORT

## 2025-03-06 ASSESSMENT — PAIN SCALES - GENERAL
PAINLEVEL_OUTOF10: 6
PAINLEVEL_OUTOF10: 6

## 2025-03-06 ASSESSMENT — PAIN DESCRIPTION - ORIENTATION
ORIENTATION: ANTERIOR
ORIENTATION: ANTERIOR

## 2025-03-06 NOTE — PERIOP NOTE
TRANSFER - IN REPORT:    Verbal report received from CON Kebede on Valeria M [facility]  being received from CON Lopez for ordered procedure      Report consisted of patient's Situation, Background, Assessment and   Recommendations(SBAR).     Information from the following report(s) Nurse Handoff Report, Index, MAR, Cardiac Rhythm Pacemaker, and Neuro Assessment was reviewed with the receiving nurse.    Opportunity for questions and clarification was provided.      Assessment completed upon patient's arrival to unit and care assumed.

## 2025-03-06 NOTE — PROGRESS NOTES
Comprehensive Nutrition Assessment    Type and Reason for Visit: Reassess    Nutrition Recommendations/Plan:   Replete Phos  Advance diet order to Low Fat, Low Fiber as medically appropriate  Provide Ensure High Protein TID (480 kcal, 57 g carbs, 48 g protein)          Malnutrition Assessment:  Malnutrition Status:  Severe malnutrition (03/06/25 1051)    Context:  Chronic Illness     Findings of the 6 clinical characteristics of malnutrition:  Energy Intake:  75% or less estimated energy requirements for 1 month or longer  Weight Loss:  Mild weight loss     Body Fat Loss:  Severe body fat loss Orbital, Buccal region   Muscle Mass Loss:  Severe muscle mass loss Temples (temporalis)  Fluid Accumulation:  No fluid accumulation     Strength:  Not Performed          Nutrition Assessment:    Patient is a 74 year old female admitted with Lactic acidosis [E87.20]  Metabolic acidosis [E87.20]  Hypoglycemia [E16.2]  Hypothermia, initial encounter [T68.XXXA]  Other fatigue [R53.83]  Sinus pause [I45.5]. She  has a past medical history of Arthritis, Chronic back pain, Chronic pancreatitis (HCC), Hypertension, and Psychiatric disorder.    3/6:  Follow up.  GES showed gastroparesis.  RD previously provided diet education for gastroparesis.  Barium swallow showed poor esophageal motility.  CT ABD showed chronic pancreatitis. Reviewed diet information with pt and spouse at bedside today.  They had multiple questions, all answered.  Pt NPO today for EGD and colonoscopy.  Previously on liquid diets.  Pt reports poor PO intakes, only drinking some broth and apple juice.  She did receive Ensure and likes that, has been drinking it.  Will resume it once diet resumes.  No new weight.  Labs: phos 2.4      3/3: MST for wt loss 14-23#. S/p pacer today due to 2 code blues yesterday.  Admit weight stated - awaiting nursing to obtain standing scale weight. Noted previous loss last year of 12# (8.6%) x 6 months, not clinically significant  age over 65    Wt Readings from Last 10 Encounters:   03/04/25 52.8 kg (116 lb 6.5 oz)   09/30/24 57.6 kg (126 lb 14.4 oz)   08/14/24 59 kg (130 lb)   03/12/24 62.7 kg (138 lb 4.8 oz)   08/28/23 61.2 kg (135 lb)           Nutrition Diagnosis:   Inadequate protein-energy intake related to altered GI function as evidenced by intake 0-25%    Nutrition Interventions:   Food and/or Nutrient Delivery: Start Oral Diet, Start Oral Nutrition Supplement  Nutrition Education/Counseling: Education/Counseling completed  Coordination of Nutrition Care: Continue to monitor while inpatient  Plan of Care discussed with: nursing    Goals:     Goals: Initiate PO diet, PO intake 50% or greater, by next RD assessment       Nutrition Monitoring and Evaluation:   Behavioral-Environmental Outcomes: None Identified  Food/Nutrient Intake Outcomes: Diet Advancement/Tolerance, Food and Nutrient Intake, Supplement Intake  Physical Signs/Symptoms Outcomes: Biochemical Data, GI Status, Weight    Discharge Planning:    Too soon to determine     Danyel Chiu RD  Available via [employer]

## 2025-03-06 NOTE — PROGRESS NOTES
1500 : TRANSFER - IN REPORT:    Verbal report received from Jam ANDUJAR on Valeria [facility]  being received from ICU for routine progression of patient care      Report consisted of patient's Situation, Background, Assessment and   Recommendations(SBAR).     Information from the following report(s) Nurse Handoff Report, Recent Results, Cardiac Rhythm NSR , and Event Log was reviewed with the receiving nurse.    Opportunity for questions and clarification was provided.      Assessment completed upon patient's arrival to unit and care assumed.         1900 : Bedside and Verbal shift change report given to Sojna ANDUJAR (oncoming nurse) by Laine ANDUJAR (offgoing nurse). Report included the following information Nurse Handoff Report, Recent Results, Cardiac Rhythm NSR , and Event Log.

## 2025-03-06 NOTE — PROGRESS NOTES
[facility] Adult  Hospitalist Group                                                                                          Hospitalist Progress Note  Saroj Newman MD  Office Phone: [phone]        Date of Service:  3/6/2025  NAME:  Valeria Lo  :  1950  MRN:  [id number]       Admission Summary:   Valeria Lo is a 74 y.o.  female with PMHx HTN, chronic back pain from lumbar spondylosis for which she vaps marijuana x 1 year, anxiety, hyperlipidemia, osteoporosis presents with above symptoms.      awoken by patient calling out to him and was extremely anxious, jittery, feeling like she was going to pass out, chills.  EMS noted BS 29.  She had Miso soup and chicken for dinner at 6:30pm yesterday.  Denies diabetes.  She was given D10 by EMS.  Serum glucose 227.  Repeat BS after 1.6L bolus 145 and then 141.     In ER, rectal temp 91.5 RR 31, pulse 86, 181/85.  Patient reported to be delirious on arrival and repeatedly saying that she was going to die.  Hair and clothes soaking wet.  Initial POC lactic 8.9, now 7.83.  She denies fever, chills, cough, SOB, chest pain.     Has chronic abdominal pain, nausea, diarrhea x 1 year with 20# weight loss, loss of appetite, intermittent vomiting.  Vomited a small amount in ER.  Denies any bloody vomit or bloody/black stool.     Scheduled for EGD/colonoscopy on 3/4/25 by Dr. Lauren at [facility] to evaluate weight loss.  Had gastric emptying scan abnormal 10/24 and esophageal dysmotility on barium swallow  but reports has not seen a GI doctor in office.  Started on Creon by pcp 1 month ago for \"chronic pancreatitis\"       Interval history / Subjective:   Endoscopy colonoscopy scheduled for today, canceled due to incomplete preparation       Assessment & Plan:      Syncope and sustained sinus pauses  -Patient had multiple code blues early morning 3/3 which was eventually thought to be sustained sinus pauses  -Telemetry demonstrated  Food in the Last Year: Never true   Transportation Needs: No Transportation Needs (3/2/2025)    PRAPARE - Transportation     Lack of Transportation (Medical): No     Lack of Transportation (Non-Medical): No   Physical Activity: Not on file   Stress: Not on file   Social Connections: Not on file   Intimate Partner Violence: Not on file   Depression: Not on file   Housing Stability: Low Risk  (3/2/2025)    Housing Stability Vital Sign     Unable to Pay for Housing in the Last Year: No     Number of Times Moved in the Last Year: 0     Homeless in the Last Year: No   Interpersonal Safety: Not At Risk (3/2/2025)    Interpersonal Safety Domain Source: IP Abuse Screening     Physical abuse: Denies     Verbal abuse: Denies     Emotional abuse: Denies     Financial abuse: Denies     Sexual abuse: Denies   Utilities: Not At Risk (3/2/2025)    [facility] Utilities     Threatened with loss of utilities: No       Review of Systems:   Pertinent items are noted in HPI.       Vital Signs:    Last 24hrs VS reviewed since prior progress note. Most recent are:  Vitals:    03/06/25 1032   BP: (!) 163/90   Pulse: 74   Resp: 18   Temp: 98.4 °F (36.9 °C)   SpO2: 97%         Intake/Output Summary (Last 24 hours) at 3/6/2025 1247  Last data filed at 3/6/2025 0959  Gross per 24 hour   Intake --   Output 1402 ml   Net -1402 ml        Physical Examination:     I had a face to face encounter with this patient and independently examined them on 3/6/2025 as outlined below:          General : alert x 3, awake, no acute distress,   HEENT: PEERL, EOMI, moist mucus membrane, TM clear  Neck: supple, no JVD, no meningeal signs  Chest: Clear to auscultation bilaterally   CVS: S1 S2 heard, Capillary refill less than 2 seconds  Abd: soft/ non tender, non distended, BS physiological,   Ext: no clubbing, no cyanosis, no edema, brisk 2+ DP pulses  Neuro/Psych: pleasant mood and affect, CN 2-12 grossly intact, sensory grossly within normal limit, Strength 5/5 in all

## 2025-03-06 NOTE — PROGRESS NOTES
[facility]             GI PROGRESS NOTE        NAME: Valeria Lo   :  1950   MRN:  [id number]       Subjective:   No egd/colon today due to incomplete prep    Feels ok today- nausea with liquid diet but able to keep more down than solids. Review of critical care note mentions daily alcohol use. Lactic acidosis and hypoglycemia improved    Cardiology notes ok to use reglan or erythromycin if needed from cardio standpoint     She has had 1 year of nausea, vomiting, cyclical diarrhea and constipation, poor PO intake     GES showing gastroparesis  Barium swallow with poor esophageal motility  Planned for egd/colonoscopy outpatient     CT c/abd/pelv chronic pancreatitis otherwise no acute findings  Objective:   Hgb stable      VITALS:   Last 24hrs VS reviewed since prior progress note. Most recent are:  Vitals:    25 1032   BP: (!) 163/90   Pulse: 74   Resp: 18   Temp: 98.4 °F (36.9 °C)   SpO2: 97%       Intake/Output Summary (Last 24 hours) at 3/6/2025 1352  Last data filed at 3/6/2025 0959  Gross per 24 hour   Intake --   Output 1402 ml   Net -1402 ml       PHYSICAL EXAM:  General: Alert, in no acute distress    HEENT: Anicteric sclerae.  Lungs:            CTA Bilaterally.   Heart:  Regular  rhythm,    Abdomen: Soft, Non distended, Non tender.  (+)Bowel sounds, no HSM  Extremities: No c/c/e  Neurologic:  CN 2-12 gi, Alert and oriented X 3.  No acute neurological distress   Psych:   Good insight. Not anxious nor agitated.    Lab Data Reviewed:   Recent Labs     25  0500   WBC 7.8 5.2   HGB 9.9* 9.9*   HCT 29.4* 30.2*    176     Recent Labs     25  0504 25  0337 25  0500    139 138   K 3.5 3.8 4.2   * 107 104   CO2 24 27 29   BUN 5* 7 9   PHOS 2.4*  --   --      Recent Labs     25  03325  0500   GLOB 2.9 2.9       ________________________________________________________________________  Patient Active Problem  List   Diagnosis    Calcaneus fracture    Osteoporosis    Lactic acidosis    Sinus pause    Vasovagal syncope    Severe protein-calorie malnutrition         Assessment and Plan:  73yo female admitted for severe hypoglycemia of unknown etiology, severe lactic acidosis, hypothermia, n/v/d/weight loss, loss of appetite. S/p pacemaker placement 3/4 after episode of asystole. Cardiology concerned her constellation of GI symptoms led to vasovagal episode leading to transient asystole  This may be all related to gastroparesis. Cardiology is ok for reglan or erythromycin if needed. Primary team has ordered urine 5hiaa and metanephrines.   Needs egd/colonoscopy for further eval of n/v/weight loss/dysphagia.   EGD/colon rescheduled for tomorrow afternoon   Prep with golytely tonight   -Continue antiemetics as needed.    GI following     Signed By: CLAIRE PATEL PA-C     3/6/2025  1:52 PM

## 2025-03-06 NOTE — PROGRESS NOTES
Physical Therapy Note:  Attempted to see patient for intervention. Received in bed and c/o both fatigue and abdominal pain. She just received morphine and expressed concern for side effects if she were to mobilize. She politely deferred.   Will follow up tomorrow.  Joel Balderas, PT

## 2025-03-07 ENCOUNTER — ANESTHESIA (OUTPATIENT)
Facility: HOSPITAL | Age: 75
DRG: 243 | End: 2025-03-07
Payer: MEDICARE

## 2025-03-07 ENCOUNTER — ANESTHESIA EVENT (OUTPATIENT)
Facility: HOSPITAL | Age: 75
DRG: 243 | End: 2025-03-07
Payer: MEDICARE

## 2025-03-07 VITALS
DIASTOLIC BLOOD PRESSURE: 81 MMHG | SYSTOLIC BLOOD PRESSURE: 164 MMHG | TEMPERATURE: 98.1 F | BODY MASS INDEX: 18.95 KG/M2 | HEIGHT: 65 IN | HEART RATE: 66 BPM | OXYGEN SATURATION: 97 % | WEIGHT: 113.76 LBS | RESPIRATION RATE: 12 BRPM

## 2025-03-07 LAB
ALBUMIN SERPL-MCNC: 2.8 G/DL (ref 3.5–5)
ALBUMIN/GLOB SERPL: 1 (ref 1.1–2.2)
ALP SERPL-CCNC: 56 U/L (ref 45–117)
ALT SERPL-CCNC: 18 U/L (ref 12–78)
ANION GAP SERPL CALC-SCNC: 8 MMOL/L (ref 2–12)
AST SERPL-CCNC: 19 U/L (ref 15–37)
BILIRUB SERPL-MCNC: 0.9 MG/DL (ref 0.2–1)
BUN SERPL-MCNC: 6 MG/DL (ref 6–20)
BUN/CREAT SERPL: 10 (ref 12–20)
CALCIUM SERPL-MCNC: 9.1 MG/DL (ref 8.5–10.1)
CHLORIDE SERPL-SCNC: 104 MMOL/L (ref 97–108)
CO2 SERPL-SCNC: 28 MMOL/L (ref 21–32)
CREAT SERPL-MCNC: 0.63 MG/DL (ref 0.55–1.02)
ERYTHROCYTE [DISTWIDTH] IN BLOOD BY AUTOMATED COUNT: 13.2 % (ref 11.5–14.5)
GLOBULIN SER CALC-MCNC: 2.8 G/DL (ref 2–4)
GLUCOSE BLD STRIP.AUTO-MCNC: 75 MG/DL (ref 65–117)
GLUCOSE BLD STRIP.AUTO-MCNC: 94 MG/DL (ref 65–117)
GLUCOSE SERPL-MCNC: 88 MG/DL (ref 65–100)
HCT VFR BLD AUTO: 27.6 % (ref 35–47)
HGB BLD-MCNC: 9.1 G/DL (ref 11.5–16)
MCH RBC QN AUTO: 29.9 PG (ref 26–34)
MCHC RBC AUTO-ENTMCNC: 33 G/DL (ref 30–36.5)
MCV RBC AUTO: 90.8 FL (ref 80–99)
NRBC # BLD: 0 K/UL (ref 0–0.01)
NRBC BLD-RTO: 0 PER 100 WBC
PLATELET # BLD AUTO: 156 K/UL (ref 150–400)
PMV BLD AUTO: 9.1 FL (ref 8.9–12.9)
POTASSIUM SERPL-SCNC: 3.9 MMOL/L (ref 3.5–5.1)
PROT SERPL-MCNC: 5.6 G/DL (ref 6.4–8.2)
RBC # BLD AUTO: 3.04 M/UL (ref 3.8–5.2)
SERVICE CMNT-IMP: NORMAL
SERVICE CMNT-IMP: NORMAL
SODIUM SERPL-SCNC: 140 MMOL/L (ref 136–145)
WBC # BLD AUTO: 4.8 K/UL (ref 3.6–11)

## 2025-03-07 PROCEDURE — 3700000001 HC ADD 15 MINUTES (ANESTHESIA): Performed by: INTERNAL MEDICINE

## 2025-03-07 PROCEDURE — 7100000010 HC PHASE II RECOVERY - FIRST 15 MIN: Performed by: INTERNAL MEDICINE

## 2025-03-07 PROCEDURE — 82962 GLUCOSE BLOOD TEST: CPT

## 2025-03-07 PROCEDURE — 6360000002 HC RX W HCPCS: Performed by: NURSE ANESTHETIST, CERTIFIED REGISTERED

## 2025-03-07 PROCEDURE — 6360000002 HC RX W HCPCS: Performed by: HOSPITALIST

## 2025-03-07 PROCEDURE — 0D758ZZ DILATION OF ESOPHAGUS, VIA NATURAL OR ARTIFICIAL OPENING ENDOSCOPIC: ICD-10-PCS | Performed by: INTERNAL MEDICINE

## 2025-03-07 PROCEDURE — 7100000011 HC PHASE II RECOVERY - ADDTL 15 MIN: Performed by: INTERNAL MEDICINE

## 2025-03-07 PROCEDURE — 94761 N-INVAS EAR/PLS OXIMETRY MLT: CPT

## 2025-03-07 PROCEDURE — 88342 IMHCHEM/IMCYTCHM 1ST ANTB: CPT

## 2025-03-07 PROCEDURE — XX20X89 MONITORING OF BRAIN ELECTRICAL ACTIVITY, COMPUTER-AIDED DETECTION AND NOTIFICATION, NEW TECHNOLOGY GROUP 9: ICD-10-PCS | Performed by: PSYCHIATRY & NEUROLOGY

## 2025-03-07 PROCEDURE — 6370000000 HC RX 637 (ALT 250 FOR IP): Performed by: FAMILY MEDICINE

## 2025-03-07 PROCEDURE — 3600007502: Performed by: INTERNAL MEDICINE

## 2025-03-07 PROCEDURE — 0DB98ZX EXCISION OF DUODENUM, VIA NATURAL OR ARTIFICIAL OPENING ENDOSCOPIC, DIAGNOSTIC: ICD-10-PCS | Performed by: INTERNAL MEDICINE

## 2025-03-07 PROCEDURE — 2580000003 HC RX 258: Performed by: FAMILY MEDICINE

## 2025-03-07 PROCEDURE — 36415 COLL VENOUS BLD VENIPUNCTURE: CPT

## 2025-03-07 PROCEDURE — 88305 TISSUE EXAM BY PATHOLOGIST: CPT

## 2025-03-07 PROCEDURE — 0DB68ZX EXCISION OF STOMACH, VIA NATURAL OR ARTIFICIAL OPENING ENDOSCOPIC, DIAGNOSTIC: ICD-10-PCS | Performed by: INTERNAL MEDICINE

## 2025-03-07 PROCEDURE — 2580000003 HC RX 258: Performed by: HOSPITALIST

## 2025-03-07 PROCEDURE — 2709999900 HC NON-CHARGEABLE SUPPLY: Performed by: INTERNAL MEDICINE

## 2025-03-07 PROCEDURE — 3600007512: Performed by: INTERNAL MEDICINE

## 2025-03-07 PROCEDURE — 6370000000 HC RX 637 (ALT 250 FOR IP): Performed by: NURSE PRACTITIONER

## 2025-03-07 PROCEDURE — 0DBK8ZZ EXCISION OF ASCENDING COLON, VIA NATURAL OR ARTIFICIAL OPENING ENDOSCOPIC: ICD-10-PCS | Performed by: INTERNAL MEDICINE

## 2025-03-07 PROCEDURE — 80053 COMPREHEN METABOLIC PANEL: CPT

## 2025-03-07 PROCEDURE — 2720000010 HC SURG SUPPLY STERILE: Performed by: INTERNAL MEDICINE

## 2025-03-07 PROCEDURE — 3700000000 HC ANESTHESIA ATTENDED CARE: Performed by: INTERNAL MEDICINE

## 2025-03-07 PROCEDURE — 85027 COMPLETE CBC AUTOMATED: CPT

## 2025-03-07 RX ORDER — SODIUM CHLORIDE 0.9 % (FLUSH) 0.9 %
5-40 SYRINGE (ML) INJECTION EVERY 12 HOURS SCHEDULED
Status: DISCONTINUED | OUTPATIENT
Start: 2025-03-07 | End: 2025-03-07 | Stop reason: HOSPADM

## 2025-03-07 RX ORDER — PROPOFOL 10 MG/ML
INJECTION, EMULSION INTRAVENOUS
Status: DISCONTINUED | OUTPATIENT
Start: 2025-03-07 | End: 2025-03-07 | Stop reason: SDUPTHER

## 2025-03-07 RX ORDER — SODIUM CHLORIDE 9 MG/ML
INJECTION, SOLUTION INTRAVENOUS PRN
Status: DISCONTINUED | OUTPATIENT
Start: 2025-03-07 | End: 2025-03-07 | Stop reason: HOSPADM

## 2025-03-07 RX ORDER — LIDOCAINE 4 G/G
1 PATCH TOPICAL DAILY
Qty: 7 EACH | Refills: 0 | Status: SHIPPED | OUTPATIENT
Start: 2025-03-08 | End: 2025-03-15

## 2025-03-07 RX ORDER — SODIUM CHLORIDE 0.9 % (FLUSH) 0.9 %
5-40 SYRINGE (ML) INJECTION PRN
Status: DISCONTINUED | OUTPATIENT
Start: 2025-03-07 | End: 2025-03-07 | Stop reason: HOSPADM

## 2025-03-07 RX ADMIN — PROPOFOL 50 MG: 10 INJECTION, EMULSION INTRAVENOUS at 14:37

## 2025-03-07 RX ADMIN — PANCRELIPASE LIPASE, PANCRELIPASE PROTEASE, PANCRELIPASE AMYLASE 40000 UNITS: 20000; 63000; 84000 CAPSULE, DELAYED RELEASE ORAL at 18:06

## 2025-03-07 RX ADMIN — POTASSIUM CHLORIDE: 2 INJECTION, SOLUTION, CONCENTRATE INTRAVENOUS at 06:57

## 2025-03-07 RX ADMIN — LOSARTAN POTASSIUM 100 MG: 50 TABLET, FILM COATED ORAL at 09:40

## 2025-03-07 RX ADMIN — PROPOFOL 150 MCG/KG/MIN: 10 INJECTION, EMULSION INTRAVENOUS at 14:33

## 2025-03-07 RX ADMIN — PANTOPRAZOLE SODIUM 40 MG: 40 TABLET, DELAYED RELEASE ORAL at 06:58

## 2025-03-07 RX ADMIN — SODIUM CHLORIDE: 9 INJECTION, SOLUTION INTRAVENOUS at 14:31

## 2025-03-07 RX ADMIN — PROPOFOL 100 MG: 10 INJECTION, EMULSION INTRAVENOUS at 14:34

## 2025-03-07 ASSESSMENT — PAIN SCALES - GENERAL
PAINLEVEL_OUTOF10: 0

## 2025-03-07 ASSESSMENT — PAIN - FUNCTIONAL ASSESSMENT: PAIN_FUNCTIONAL_ASSESSMENT: 0-10

## 2025-03-07 ASSESSMENT — PAIN DESCRIPTION - DESCRIPTORS: DESCRIPTORS: PRESSURE

## 2025-03-07 NOTE — PROCEDURES
[facility]  Guillermo Miller M.D.  [phone]            3/7/2025          Colonoscopy Operative Report  [facility]  1950  [facility] Medical Record Number:  [id number]      Indications:   Weight loss      :  Guillermo Miller MD    Assistant -- None  Implants -- None    Referring Provider: Beatris Vick MD    Sedation:  MAC anesthesia Propofol    Pre-Procedural Exam:      Airway: clear,  No airway problems anticipated  Heart: RRR, without gallops or rubs  Lungs: clear bilaterally without wheezes, crackles, or rhonchi  Abdomen: soft, nontender, nondistended, bowel sounds present  Mental Status: awake, alert and oriented to person, place and time     Procedure Details:  After informed consent was obtained with all risks and benefits of procedure explained and preoperative exam completed, the patient was taken to the endoscopy suite and placed in the left lateral decubitus position.  Upon sequential sedation as per above, a digital rectal exam was performed. The Olympus videocolonoscope  was inserted in the rectum and carefully advanced to the terminal ileum.  The quality of preparation was good.  The colonoscope was slowly withdrawn with careful inspection and evaluation between folds. Retroflexion in the rectum was performed.    Findings:   Terminal Ileum: normal  Cecum: normal  Ascending Colon:  5  Sessile polyp(s), the largest 6 mm in size;  Transverse Colon: normal  Descending Colon: normal  Sigmoid: normal  Rectum: normal    Interventions:  5 complete polypectomy were performed using cold snare  and the polyps were  retrieved    Specimen Removed:  specimen #1, 6 mm in size, located in the ascending colon removed by cold snare and retrieved for pathology    Complications: None.     EBL:  None.    Impression:  5 polyps removed as above, otherwise normal exam    Recommendations:  -Await pathology.  -If adenoma is present, repeat colonoscopy in 3 years.   -Diet as  tolerated      Discharge Disposition:  Home in the company of a  when able to ambulate.    Guillemro Miller MD  3/7/2025  3:01 PM

## 2025-03-07 NOTE — PROGRESS NOTES
TRANSFER - IN REPORT:    Verbal report received from CON Garrison on Valeria JORGE [facility]  being received from 313 for ordered procedure      Report consisted of patient's Situation, Background, Assessment and   Recommendations(SBAR).     Information from the following report(s) Nurse Handoff Report and Recent Results was reviewed with the receiving nurse.    Opportunity for questions and clarification was provided.      Assessment completed upon patient's arrival to unit and care assumed.

## 2025-03-07 NOTE — PLAN OF CARE
Problem: Discharge Planning  Goal: Discharge to home or other facility with appropriate resources  Outcome: Progressing     Problem: Skin/Tissue Integrity  Goal: Skin integrity remains intact  Description: 1.  Monitor for areas of redness and/or skin breakdown  2.  Assess vascular access sites hourly  3.  Every 4-6 hours minimum:  Change oxygen saturation probe site  4.  Every 4-6 hours:  If on nasal continuous positive airway pressure, respiratory therapy assess nares and determine need for appliance change or resting period  Outcome: Progressing     Problem: Pain  Goal: Verbalizes/displays adequate comfort level or baseline comfort level  Outcome: Progressing     Problem: Cardiovascular - Adult  Goal: Maintains optimal cardiac output and hemodynamic stability  Outcome: Progressing     Problem: Skin/Tissue Integrity - Adult  Goal: Skin integrity remains intact  Description: 1.  Monitor for areas of redness and/or skin breakdown  2.  Assess vascular access sites hourly  3.  Every 4-6 hours minimum:  Change oxygen saturation probe site  4.  Every 4-6 hours:  If on nasal continuous positive airway pressure, respiratory therapy assess nares and determine need for appliance change or resting period  Outcome: Progressing     Problem: Gastrointestinal - Adult  Goal: Minimal or absence of nausea and vomiting  Outcome: Progressing  Goal: Maintains adequate nutritional intake  Outcome: Progressing

## 2025-03-07 NOTE — PROGRESS NOTES
TRANSFER - OUT REPORT:    Verbal report given to CON Brewster [facility] to [place] [id number] for routine post-op       Report consisted of patient's Situation, Background, Assessment and   Recommendations(SBAR).     Information from the following report(s) Nurse Handoff Report, Adult Overview, Surgery Report, and Recent Results was reviewed with the receiving nurse.           Lines:   Extended Dwell Peripherial IV 03/04/25 Left Cephalic (Active)   Criteria for Appropriate Use Limited/no vessel suitable for conventional peripheral access 03/07/25 1315   Site Assessment Clean, dry & intact 03/07/25 1315   Phlebitis Assessment No symptoms 03/07/25 1315   Infiltration Assessment 0 03/07/25 1315   Line Status Infusing;No blood return 03/07/25 1315   Line Care Cap changed 03/07/25 1315   Alcohol Cap Used Yes 03/07/25 0816   Date of Last Dressing Change 03/04/25 03/07/25 0816   Dressing Type Transparent w/CHG gel 03/07/25 1315   Dressing Status Clean, dry & intact 03/07/25 1315   Dressing Intervention New 03/04/25 1751        Opportunity for questions and clarification was provided.      Patient transported with:  Monitor and Registered Nurse

## 2025-03-07 NOTE — ANESTHESIA PRE PROCEDURE
0.9 % injection 5-40 mL  5-40 mL IntraVENous PRN Kusum Roper MD       • 0.9 % sodium chloride infusion   IntraVENous PRN Kusum Roper MD       • lipase-protease-amylase (ZENPEP) 80766-65179 units delayed release capsule 40,000 Units  40,000 Units Oral TID WC Dee Weaver PA-C   40,000 Units at 03/06/25 1846   • prochlorperazine (COMPAZINE) injection 5 mg  5 mg IntraVENous Q6H PRN Dee Weaver PA-C   5 mg at 03/05/25 0018   • diazePAM (VALIUM) tablet 5 mg  5 mg Oral Q1H PRN Kusum Roper MD        Or   • diazePAM (VALIUM) injection 5 mg  5 mg IntraVENous Q1H PRN Kusum Roper MD   5 mg at 03/05/25 1812    Or   • diazePAM (VALIUM) tablet 10 mg  10 mg Oral Q1H PRN Kusum Roper MD        Or   • diazePAM (VALIUM) injection 10 mg  10 mg IntraVENous Q1H PRN Kusum Roper MD        Or   • diazePAM (VALIUM) tablet 15 mg  15 mg Oral Q1H PRN Kusum Roper MD        Or   • diazePAM (VALIUM) injection 15 mg  15 mg IntraVENous Q1H PRN Kusum Roper MD        Or   • diazePAM (VALIUM) tablet 20 mg  20 mg Oral Q1H PRN Kusum Roper MD        Or   • diazePAM (VALIUM) injection 20 mg  20 mg IntraVENous Q1H PRN Kusum Roper MD       • losartan (COZAAR) tablet 100 mg  100 mg Oral Daily Kusum Roper MD   100 mg at 03/07/25 0940   • lidocaine 4 % external patch 1 patch  1 patch TransDERmal Daily Lakesha Hunter APRN - CNP   1 patch at 03/07/25 0813   • enoxaparin (LOVENOX) injection 40 mg  40 mg SubCUTAneous Nightly Eb Guillaume MD   40 mg at 03/06/25 2120   • hydrOXYzine HCl (ATARAX) tablet 10 mg  10 mg Oral TID PRN Eb Guillaume MD       • sodium chloride flush 0.9 % injection 5-40 mL  5-40 mL IntraVENous 2 times per day Narcisa Morgan MD   10 mL at 03/06/25 2124   • sodium chloride flush 0.9 % injection 5-40 mL  5-40 mL IntraVENous PRN Narcisa Morgan MD   10 mL at 03/02/25 0834   • 0.9 % sodium chloride infusion   IntraVENous PRN Narcisa Morgan MD

## 2025-03-07 NOTE — PROGRESS NOTES
Initial RN admission and assessment performed and documented in Endoscopy navigator.     Patient evaluated by anesthesia in pre-procedure holding.     All procedural vital signs, airway assessment, and level of consciousness information monitored and recorded by anesthesia staff on the anesthesia record.     Report received from CRNA post procedure.  Patient transported to recovery area by RN.    Endoscopy post procedure time out was performed and specimens were verified by physician.    Endoscope was pre-cleaned at bedside immediately following procedure by Susan TIPTON

## 2025-03-07 NOTE — PROCEDURES
[facility]  Guillermo Miller M.D.  [phone]           3/7/2025                EGD Operative Report  Valeria Lo  :  1950  [facility] Medical Record Number:  [id number]      Indication: N/V     : Guillermo Miller MD    Assistant -- None  Implants -- None    Referring Provider:  Beatris Vick MD      Anesthesia/Sedation:  MAC anesthesia Propofol    Airway assessment: No airway problems anticipated    Pre-Procedural Exam:      Airway: clear, no airway problems anticipated  Heart: RRR, without gallops or rubs  Lungs: clear bilaterally without wheezes, crackles, or rhonchi  Abdomen: soft, nontender, nondistended, bowel sounds present  Mental Status: awake, alert and oriented to person, place and time       Procedure Details     After infomed consent was obtained for the procedure, with all risks and benefits of procedure explained the patient was taken to the endoscopy suite and placed in the left lateral decubitus position.  Following sequential administration of sedation as per above, the endoscope was inserted into the mouth and advanced under direct vision to second portion of the duodenum.  A careful inspection was made as the gastroscope was withdrawn, including a retroflexed view of the proximal stomach; findings and interventions are described below.      Findings:   Esophagus:normal  Stomach: normal   Duodenum/jejunum: normal    Therapies:  esophageal dilation with 18-20mm sized balloon  biopsy of stomach  - r/o H.pylori  biopsy of duodenal - r/o celiac disease    Specimens:  as above           Complications:   None; patient tolerated the procedure well.    EBL:  None.           Impression:   Normal EGD    Recommendations:    -Await pathology.  -Proceed with colonoscopy today as planned    Guillermo Miller MD

## 2025-03-07 NOTE — PROGRESS NOTES
[facility] Adult  Hospitalist Group                                                                                          Hospitalist Progress Note  Saroj Newman MD  Office Phone: [phone]        Date of Service:  3/7/2025  NAME:  Valeria Lo  :  1950  MRN:  [id number]       Admission Summary:   Valeria Lo is a 74 y.o.  female with PMHx HTN, chronic back pain from lumbar spondylosis for which she vaps marijuana x 1 year, anxiety, hyperlipidemia, osteoporosis presents with above symptoms.      awoken by patient calling out to him and was extremely anxious, jittery, feeling like she was going to pass out, chills.  EMS noted BS 29.  She had Miso soup and chicken for dinner at 6:30pm yesterday.  Denies diabetes.  She was given D10 by EMS.  Serum glucose 227.  Repeat BS after 1.6L bolus 145 and then 141.     In ER, rectal temp 91.5 RR 31, pulse 86, 181/85.  Patient reported to be delirious on arrival and repeatedly saying that she was going to die.  Hair and clothes soaking wet.  Initial POC lactic 8.9, now 7.83.  She denies fever, chills, cough, SOB, chest pain.     Has chronic abdominal pain, nausea, diarrhea x 1 year with 20# weight loss, loss of appetite, intermittent vomiting.  Vomited a small amount in ER.  Denies any bloody vomit or bloody/black stool.     Scheduled for EGD/colonoscopy on 3/4/25 by Dr. Lauren at [facility] to evaluate weight loss.  Had gastric emptying scan abnormal 10/24 and esophageal dysmotility on barium swallow  but reports has not seen a GI doctor in office.  Started on Creon by pcp 1 month ago for \"chronic pancreatitis\"       Interval history / Subjective:   Endoscopy colonoscopy scheduled for today       Assessment & Plan:      Syncope and sustained sinus pauses  -Patient had multiple code blues early morning 3/3 which was eventually thought to be sustained sinus pauses  -Telemetry demonstrated evidence of vasovagal response with gradual  slowing of her sinus rate followed by junctional rhythm and prolonged pauses of unclear precipitating factors  -Cardiology evaluated and placed a pacemaker on 3/3.  Cardiology suggest underlying GI pathology may be a trigger?  -Echo with preserved EF and normal wall motion     Unintentional weight loss  Chronic nausea  -CT chest abdomen pelvis with IV and oral contrast without any acute findings  -She was started on Creon for presumed pancreatic insufficiency by her PCP.  CT abdomen and pelvis suggests chronic calcific pancreatitis  -she had a previous abnormal gastric emptying study, however she is not a great candidate for Reglan or erythromycin given above issues  -GI evaluated, plan for ENDOSCOPY 3.6.25, canceled due to incomplete preparation as scheduled for 3/7/2025     Severe hypoglycemia  Severe hypothermia  Severe lactic acidosis  -Lactic acid now normalized  -Blood sugars improved with fluids  -Off [place]ingo hufranker     Rule out carcinoid  -Intensivist has ordered 24-hour urine for 5-HIAA and metanephrines  -Follow-up results     History of alcohol abuse  -Will place on CIWA     Hypertension  Hyperlipidemia  -Resume losartan, Crestor     Hypokalemia  -Repleted per protocol     Outisde Records, prior notes, labs, radiology, and medications reviewed      Code status: Full code  DVT prophylaxis: Lovenox            Social Determinants of Health     Tobacco Use: Medium Risk (3/7/2025)    Patient History     Smoking Tobacco Use: Former     Smokeless Tobacco Use: Former     Passive Exposure: Not on file   Alcohol Use: Not At Risk (3/2/2025)    AUDIT-C     Frequency of Alcohol Consumption: 4 or more times a week     Average Number of Drinks: 1 or 2     Frequency of Binge Drinking: Never   Financial Resource Strain: Not on file   Food Insecurity: No Food Insecurity (3/2/2025)    Hunger Vital Sign     Worried About Running Out of Food in the Last Year: Never true     Ran Out of Food in the Last Year: Never true

## 2025-03-07 NOTE — ANESTHESIA POSTPROCEDURE EVALUATION
Department of Anesthesiology  Postprocedure Note    Patient: Valeria Lo  MRN: [id number]  YOB: 1950  Date of evaluation: 3/7/2025    Procedure Summary       Date: 03/07/25 Room / Location: [facility] 03 / [facility] ENDOSCOPY    Anesthesia Start: 1431 Anesthesia Stop: 1500    Procedures:       ESOPHAGOGASTRODUODENOSCOPY      COLONOSCOPY DIAGNOSTIC (Lower GI Region)      ESOPHAGOGASTRODUODENOSCOPY BIOPSY (Upper GI Region)      ESOPHAGOSCOPY DILATION BALLOON (Upper GI Region)      COLONOSCOPY POLYPECTOMY SNARE/BIOPSY (Lower GI Region) Diagnosis:       Weight loss      (Weight loss [R63.4])    Surgeons: Guillermo Miller MD Responsible Provider: Farhad Boland MD    Anesthesia Type: TIVA ASA Status: 3            Anesthesia Type: TIVA    Jeane Phase I: Jeane Score: 10    Jeane Phase II: Jeane Score: 10    Anesthesia Post Evaluation    Patient location during evaluation: PACU  Patient participation: complete - patient participated  Level of consciousness: awake and alert  Pain score: 0  Airway patency: patent  Nausea & Vomiting: no nausea and no vomiting  Cardiovascular status: blood pressure returned to baseline  Respiratory status: acceptable  Hydration status: euvolemic  Pain management: satisfactory to patient    No notable events documented.

## 2025-03-07 NOTE — DISCHARGE SUMMARY
Discharge Summary       PATIENT ID: Valeria Lo  MRN: [id number]   YOB: 1950    DATE OF ADMISSION: 3/2/2025  2:46 AM    DATE OF DISCHARGE: 3.7.25   PRIMARY CARE PROVIDER: Beatris Vick MD     ATTENDING PHYSICIAN: MURALI HAMPTON  DISCHARGING PROVIDER: Murali Hampton MD    To contact this individual call [phone] and ask the  to page.  If unavailable ask to be transferred the Adult Hospitalist Department.    CONSULTATIONS: IP CONSULT TO GI  IP CONSULT TO CARDIOLOGY  IP CONSULT TO INTENSIVIST  IP CONSULT TO DIETITIAN  IP CONSULT TO SOCIAL WORK    PROCEDURES/SURGERIES: Procedure(s):  ESOPHAGOGASTRODUODENOSCOPY  COLONOSCOPY DIAGNOSTIC  ESOPHAGOGASTRODUODENOSCOPY BIOPSY  ESOPHAGOSCOPY DILATION BALLOON  COLONOSCOPY POLYPECTOMY SNARE/BIOPSY    ADMITTING DIAGNOSES & HOSPITAL COURSE:   Valeria Lo is a 74 y.o.  female with PMHx HTN, chronic back pain from lumbar spondylosis for which she vaps marijuana x 1 year, anxiety, hyperlipidemia, osteoporosis presents with above symptoms.      awoken by patient calling out to him and was extremely anxious, jittery, feeling like she was going to pass out, chills.  EMS noted BS 29.  She had Miso soup and chicken for dinner at 6:30pm yesterday.  Denies diabetes.  She was given D10 by EMS.  Serum glucose 227.  Repeat BS after 1.6L bolus 145 and then 141.     In ER, rectal temp 91.5 RR 31, pulse 86, 181/85.  Patient reported to be delirious on arrival and repeatedly saying that she was going to die.  Hair and clothes soaking wet.  Initial POC lactic 8.9, now 7.83.  She denies fever, chills, cough, SOB, chest pain.     Has chronic abdominal pain, nausea, diarrhea x 1 year with 20# weight loss, loss of appetite, intermittent vomiting.  Vomited a small amount in ER.  Denies any bloody vomit or bloody/black stool.     Scheduled    HEENT: PEERL, EOMI, moist mucus membrane, TM clear  Neck: supple, no JVD, no meningeal signs  Chest: Clear to auscultation bilaterally   CVS: S1 S2 heard, Capillary refill less than 2 seconds  Abd: soft/ Non tender, non distended, BS physiological,   Ext: no clubbing, no cyanosis, no edema, brisk 2+ DP pulses  Neuro/Psych: pleasant mood and affect, CN 2-12 grossly intact, sensory grossly within normal limit, Strength 5/5 in all extremities, DTR 1+ x 4  Skin: warm     CHRONIC MEDICAL DIAGNOSES:      Greater than 31 minutes were spent with the patient on counseling and coordination of care    Signed:   Saroj Newman MD  3/7/2025  3:07 PM

## 2025-03-08 LAB
BACTERIA SPEC CULT: NORMAL
BACTERIA SPEC CULT: NORMAL
SERVICE CMNT-IMP: NORMAL
SERVICE CMNT-IMP: NORMAL

## 2025-03-08 NOTE — PLAN OF CARE
Problem: Discharge Planning  Goal: Discharge to home or other facility with appropriate resources  Outcome: Adequate for Discharge     Problem: Skin/Tissue Integrity  Goal: Skin integrity remains intact  Description: 1.  Monitor for areas of redness and/or skin breakdown  2.  Assess vascular access sites hourly  3.  Every 4-6 hours minimum:  Change oxygen saturation probe site  4.  Every 4-6 hours:  If on nasal continuous positive airway pressure, respiratory therapy assess nares and determine need for appliance change or resting period  Outcome: Adequate for Discharge     Problem: Safety - Adult  Goal: Free from fall injury  Outcome: Adequate for Discharge     Problem: Pain  Goal: Verbalizes/displays adequate comfort level or baseline comfort level  Outcome: Adequate for Discharge     Problem: Nutrition Deficit:  Goal: Optimize nutritional status  Outcome: Adequate for Discharge     Problem: Cardiovascular - Adult  Goal: Maintains optimal cardiac output and hemodynamic stability  Outcome: Adequate for Discharge     Problem: Skin/Tissue Integrity - Adult  Goal: Skin integrity remains intact  Description: 1.  Monitor for areas of redness and/or skin breakdown  2.  Assess vascular access sites hourly  3.  Every 4-6 hours minimum:  Change oxygen saturation probe site  4.  Every 4-6 hours:  If on nasal continuous positive airway pressure, respiratory therapy assess nares and determine need for appliance change or resting period  Outcome: Adequate for Discharge     Problem: Gastrointestinal - Adult  Goal: Minimal or absence of nausea and vomiting  Outcome: Adequate for Discharge  Goal: Maintains adequate nutritional intake  Outcome: Adequate for Discharge     Problem: Decision Making  Goal: Pt/Family able to effectively weigh alternatives and participate in decision making related to treatment and care  Description: INTERVENTIONS:  1. Determine when there are differences between patient's view, family's view, and  healthcare provider's view of condition  2. Facilitate patient and family articulation of goals for care  3. Help patient and family identify pros/cons of alternative solutions  4. Provide information as requested by patient/family  5. Respect patient/family right to receive or not to receive information  6. Serve as a liaison between patient and family and health care team  7. Initiate Consults from Ethics, Palliative Care or initiate Family Care Conference as is appropriate  Outcome: Adequate for Discharge

## 2025-03-13 LAB
5OH-INDOLEACETATE 24H UR-MCNC: 0.9 MG/L
5OH-INDOLEACETATE 24H UR-MRATE: 3.6 MG/24 HR (ref 0–14.9)
COLLECT DURATION TIME UR: 24 HR
COLLECT DURATION TIME UR: 24 HR
METANEPH 24H UR-MRATE: 68 UG/24 HR (ref 36–209)
METANEPHS 24H UR-MCNC: 17 UG/L
NORMETANEPHRINE 24H UR-MCNC: 59 UG/L
NORMETANEPHRINE 24H UR-MRATE: 236 UG/24 HR (ref 131–612)
SPECIMEN VOL ?TM UR: 4000 ML
SPECIMEN VOL ?TM UR: 4000 ML

## 2025-03-14 ENCOUNTER — PROCEDURE VISIT (OUTPATIENT)
Age: 75
End: 2025-03-14

## 2025-03-14 DIAGNOSIS — Z95.0 CARDIAC PACEMAKER IN SITU: Primary | ICD-10-CM

## 2025-03-14 NOTE — PROGRESS NOTES
Patient presents for wound check post-device implantation. The dressing was removed and the site was inspected. The site appeared to be well-healing without ecchymosis/tenderness/erythema. Denies pain, fevers, discharge.         Future Appointments   Date Time Provider Department Center   3/31/2025  1:30 PM  [facility]ST[facility]ACK [facility]   6/6/2025 10:20 AM PACEMAKER, [facility]   6/6/2025 10:40 AM Aye Matthew [facility]         Reviewed limb restrictions and site care, patient verbalized understanding  Continue follow up in device clinic as planned.

## 2025-03-20 NOTE — PROGRESS NOTES
Physician Progress Note      PATIENT:               TINO DE LA TRORE  CSN #:                  [id number]  :                       1950  ADMIT DATE:       3/2/2025 2:46 AM  DISCH DATE:        3/7/2025 8:59 PM  RESPONDING  PROVIDER #:        Saroj Newman MD          QUERY TEXT:    Good Morning    This patient admitted on 2025-2025 for Bradycardia.    On 2025, RD was consulted due to patient unintentional weight loss , BMI   of 18.93    If possible, please document in progress notes and discharge summary if you   are evaluating and /or treating any of the following:    The medical record reflects the following:  Risk Factors: Alcohol abuse, Pancreatic insufficiency, Gastroparesis ,   esophageal motility  Clinical Indicators: Per RD assessment on 2025- Documented severe body   fat loss orbital buccal region, Severe muscle mass loss temples. Mild weight   loss, and document 75% or less of the estimated energy requirement for 1 month   or longer  Treatment: RD consulted, Low fat, low fiber, Ensure high protein TID, Replete   Phso, Daily labs,      Thank you  MACIE White,RN, CPHQ, CCDS, SMART    ASPEN Criteria:    https://aspenjournals.onlinelibrary.nation.com/doi/full/10.1177/171430393011244  5  Options provided:  -- Protein calorie malnutrition severe  -- Other - I will add my own diagnosis  -- Disagree - Not applicable / Not valid  -- Disagree - Clinically unable to determine / Unknown  -- Refer to Clinical Documentation Reviewer    PROVIDER RESPONSE TEXT:    This patient has severe protein calorie malnutrition.    Query created by: Coco Sarmiento on 3/20/2025 8:15 AM      Electronically signed by:  Saroj Newman MD 3/20/2025 2:35 PM

## 2025-03-31 ENCOUNTER — HOSPITAL ENCOUNTER (OUTPATIENT)
Facility: HOSPITAL | Age: 75
Setting detail: INFUSION SERIES
Discharge: HOME | End: 2025-03-31
Payer: MEDICARE

## 2025-03-31 VITALS
WEIGHT: 115.8 LBS | TEMPERATURE: 98.6 F | DIASTOLIC BLOOD PRESSURE: 62 MMHG | OXYGEN SATURATION: 98 % | HEART RATE: 69 BPM | SYSTOLIC BLOOD PRESSURE: 125 MMHG | RESPIRATION RATE: 16 BRPM | BODY MASS INDEX: 19.27 KG/M2

## 2025-03-31 DIAGNOSIS — M81.0 OSTEOPOROSIS, UNSPECIFIED OSTEOPOROSIS TYPE, UNSPECIFIED PATHOLOGICAL FRACTURE PRESENCE: Primary | ICD-10-CM

## 2025-03-31 LAB
ANION GAP BLD CALC-SCNC: 10.2 MMOL/L (ref 10–20)
CA-I BLD-MCNC: 1.25 MMOL/L (ref 1.15–1.33)
CHLORIDE BLD-SCNC: 103 MMOL/L (ref 98–107)
CO2 BLD-SCNC: 24.8 MMOL/L (ref 21–32)
CREAT BLD-MCNC: 1.29 MG/DL (ref 0.6–1.3)
EKG ATRIAL RATE: 66 BPM
EKG DIAGNOSIS: NORMAL
EKG P AXIS: 70 DEGREES
EKG P-R INTERVAL: 174 MS
EKG Q-T INTERVAL: 430 MS
EKG QRS DURATION: 94 MS
EKG QTC CALCULATION (BAZETT): 450 MS
EKG R AXIS: 45 DEGREES
EKG T AXIS: 41 DEGREES
EKG VENTRICULAR RATE: 66 BPM
GLUCOSE BLD-MCNC: 138 MG/DL (ref 74–99)
PHOSPHATE SERPL-MCNC: 4.7 MG/DL (ref 2.6–4.7)
POTASSIUM BLD-SCNC: 4.6 MMOL/L (ref 3.5–5.1)
SERVICE CMNT-IMP: ABNORMAL
SODIUM BLD-SCNC: 138 MMOL/L (ref 136–145)

## 2025-03-31 PROCEDURE — 80047 BASIC METABLC PNL IONIZED CA: CPT

## 2025-03-31 PROCEDURE — 84100 ASSAY OF PHOSPHORUS: CPT

## 2025-03-31 PROCEDURE — 6360000002 HC RX W HCPCS: Performed by: FAMILY MEDICINE

## 2025-03-31 PROCEDURE — 96372 THER/PROPH/DIAG INJ SC/IM: CPT

## 2025-03-31 RX ADMIN — DENOSUMAB 60 MG: 60 INJECTION SUBCUTANEOUS at 13:56

## 2025-03-31 ASSESSMENT — PAIN SCALES - GENERAL: PAINLEVEL_OUTOF10: 0

## 2025-03-31 NOTE — PROGRESS NOTES
[facility] Progress Note    Date: 2025    Name: Valeria Lo    MRN: [id number]         : 1950    Ms. Lo Arrived ambulatory and in no distress for Prolia Injection.  Assessment was completed, no acute issues at this time, no new complaints voiced.  Patient labs were drawn peripherally and sent for processing.      Ms. Lo's vitals were reviewed.  Vitals:    25 1315   BP: 125/62   Pulse: 69   Resp: 16   Temp: 98.6 °F (37 °C)   SpO2: 98%     Labs were obtained and reviewed.  Recent Results (from the past 12 hours)   POC CHEM 8    Collection Time: 25  1:36 PM   Result Value Ref Range    POC Ionized Calcium 1.25 1.15 - 1.33 mmol/L    POC Sodium 138 136 - 145 mmol/L    POC Potassium 4.6 3.5 - 5.1 mmol/L    POC Chloride 103 98 - 107 mmol/L    POC TCO2 24.8 21 - 32 mmol/L    Anion Gap, POC 10.2 10 - 20 mmol/L    POC Glucose 138 (H) 74 - 99 mg/dL    POC Creatinine 1.29 0.6 - 1.3 mg/dL    eGFR, POC 44 (L) >60 ml/min/1.73m2    UA Comment Comment Not Indicated.           Medications:  Medications Administered         denosumab (PROLIA) SC injection 60 mg Admin Date  2025 Action  Given Dose  60 mg Route  SubCUTAneous Documented By  Yolanda Nance, RN          Administered in back of left arm.    Ms. Lo tolerated treatment well and [facility].   Patient is aware of future appointments.    Future Appointments   Date Time Provider Department Center   2025 10:20 AM PACEMAKER, [facility] AMB   2025 10:40 AM Aye Matthew APRN - NP [facility] AMB   2025  1:30 PM [facility]       Yolanda Nance RN  2025

## 2025-05-09 PROBLEM — I10 PRIMARY HYPERTENSION: Status: ACTIVE | Noted: 2025-05-09

## 2025-05-09 PROBLEM — Z95.0 PACEMAKER: Status: ACTIVE | Noted: 2025-05-09

## 2025-05-09 NOTE — PROGRESS NOTES
[facility] CARDIOLOGY  Cardiac Electrophysiology Note     []Initial Encounter     [x]Follow-up    [facility]1950 - MRN:[id number]  Primary Cardiologist: None  Consulting Cardiologist: Julieta Reyez MD, [facility], [facility]         HPI:  74 y.o.  woman who presents for follow up of her pacemaker.     She is doing well and denies chest pain, dyspnea, dizziness or syncope. She has an ocassional brief palpitation that last only a second.          Assessment and Plan     [employer]onic Dual Chamber Pacemaker   Today's interrogation showed normal lead and device function   14.4 years on battery   2% A-paced  2% V-paced  SVT x5, longest 2 minutes  - Continue Q3 month remote device transmissions. Follow-up in the EP clinic in one year, or sooner for any concerns.     SVT  - 5 episodes on interrogation. Longest 2 minutes.   - We discussed adding beta blocker but symptoms aren't bothersome and she prefers to avoid medications.     Severe recurrent sinus pause/syncope  Vasovagal Syncope  S/p dual-chamber PPM 3/3/2025  She had multiple recurrent sustained pauses of up to 10 seconds since she has been in the ICU.  Review of telemetry clearly demonstrated evidence of vasovagal response with gradual slowing of her sinus rate followed by junctional rhythm and subsequent prolonged pauses.  Unclear precipitating factors.  1 event occurred while getting blood drawn.  During the episode that I witnessed while talking to her, there was no trigger prior to her subsequent severe bradycardia and loss of consciousness responding to atropine.  The physiology is clearly vasovagal, the trigger is unclear but likely has some correlation with her underlying GI pathology with current constellation of nausea, vomiting, decreased p.o. intake and weight loss.   TTE with preserved LVEF 75%, hyperdynamic LV systolic function.  - s/p dual-chamber PPM by Dr. Reyez on 3/3/2025

## 2025-06-02 NOTE — ED NOTES
Plan of care and all test ordered explained to pt by Dr Garcia Lewis. Good understanding and agreement with plan was verbalized. Pt declines pain med at this time. Declines ice pack. Call bell in reach, use explained. Strong peripheral pulses

## 2025-06-06 ENCOUNTER — OFFICE VISIT (OUTPATIENT)
Age: 75
End: 2025-06-06
Payer: MEDICARE

## 2025-06-06 ENCOUNTER — PROCEDURE VISIT (OUTPATIENT)
Age: 75
End: 2025-06-06

## 2025-06-06 VITALS
HEIGHT: 65 IN | HEART RATE: 80 BPM | SYSTOLIC BLOOD PRESSURE: 130 MMHG | DIASTOLIC BLOOD PRESSURE: 80 MMHG | BODY MASS INDEX: 18.76 KG/M2 | WEIGHT: 112.6 LBS | OXYGEN SATURATION: 97 %

## 2025-06-06 DIAGNOSIS — I10 PRIMARY HYPERTENSION: ICD-10-CM

## 2025-06-06 DIAGNOSIS — R55 VASOVAGAL SYNCOPE: ICD-10-CM

## 2025-06-06 DIAGNOSIS — Z95.0 PACEMAKER: Primary | ICD-10-CM

## 2025-06-06 DIAGNOSIS — Z95.0 CARDIAC PACEMAKER IN SITU: Primary | ICD-10-CM

## 2025-06-06 PROCEDURE — 3017F COLORECTAL CA SCREEN DOC REV: CPT | Performed by: NURSE PRACTITIONER

## 2025-06-06 PROCEDURE — 1036F TOBACCO NON-USER: CPT | Performed by: NURSE PRACTITIONER

## 2025-06-06 PROCEDURE — 99214 OFFICE O/P EST MOD 30 MIN: CPT | Performed by: NURSE PRACTITIONER

## 2025-06-06 PROCEDURE — 1090F PRES/ABSN URINE INCON ASSESS: CPT | Performed by: NURSE PRACTITIONER

## 2025-06-06 PROCEDURE — 1123F ACP DISCUSS/DSCN MKR DOCD: CPT | Performed by: NURSE PRACTITIONER

## 2025-06-06 PROCEDURE — G8420 CALC BMI NORM PARAMETERS: HCPCS | Performed by: NURSE PRACTITIONER

## 2025-06-06 PROCEDURE — 1160F RVW MEDS BY RX/DR IN RCRD: CPT | Performed by: NURSE PRACTITIONER

## 2025-06-06 PROCEDURE — G8399 PT W/DXA RESULTS DOCUMENT: HCPCS | Performed by: NURSE PRACTITIONER

## 2025-06-06 PROCEDURE — 93010 ELECTROCARDIOGRAM REPORT: CPT | Performed by: NURSE PRACTITIONER

## 2025-06-06 PROCEDURE — 1126F AMNT PAIN NOTED NONE PRSNT: CPT | Performed by: NURSE PRACTITIONER

## 2025-06-06 PROCEDURE — 3075F SYST BP GE 130 - 139MM HG: CPT | Performed by: NURSE PRACTITIONER

## 2025-06-06 PROCEDURE — 1159F MED LIST DOCD IN RCRD: CPT | Performed by: NURSE PRACTITIONER

## 2025-06-06 PROCEDURE — 93005 ELECTROCARDIOGRAM TRACING: CPT | Performed by: NURSE PRACTITIONER

## 2025-06-06 PROCEDURE — 3079F DIAST BP 80-89 MM HG: CPT | Performed by: NURSE PRACTITIONER

## 2025-06-06 PROCEDURE — G8427 DOCREV CUR MEDS BY ELIG CLIN: HCPCS | Performed by: NURSE PRACTITIONER

## 2025-06-06 RX ORDER — ALPRAZOLAM 0.25 MG
0.25 TABLET ORAL PRN
COMMUNITY

## 2025-06-06 NOTE — PROGRESS NOTES
Chief Complaint   Patient presents with    Fatigue    SYNCOPE     Vitals:    06/06/25 1029   BP: 130/80   BP Site: Left Upper Arm   Patient Position: Sitting   Pulse: 80   SpO2: 97%   Weight: 51.1 kg (112 lb 9.6 oz)   Height: 1.651 m (5' 5\")         Chest pain: DENIED     Recent hospital stays: DENIED     Refills: DENIED

## 2025-06-09 ENCOUNTER — TRANSCRIBE ORDERS (OUTPATIENT)
Facility: HOSPITAL | Age: 75
End: 2025-06-09

## 2025-06-09 DIAGNOSIS — K86.2 PANCREATIC CYST: ICD-10-CM

## 2025-06-09 DIAGNOSIS — K86.1 CHRONIC PANCREATITIS, UNSPECIFIED PANCREATITIS TYPE (HCC): ICD-10-CM

## 2025-06-09 DIAGNOSIS — K86.89 PANCREATIC INSUFFICIENCY: Primary | ICD-10-CM

## 2025-07-13 PROCEDURE — 93294 REM INTERROG EVL PM/LDLS PM: CPT | Performed by: INTERNAL MEDICINE

## (undated) DEVICE — STRIP,CLOSURE,WOUND,MEDI-STRIP,1/2X4: Brand: MEDLINE

## (undated) DEVICE — SOLIDIFIER FLD 2OZ 1500CC N DISINF IN BTL DISP SAFESORB

## (undated) DEVICE — SUTURE MONOCRYL STRATAFIX SPRL + SZ 4-0 L12IN ABSRB UD PS-2 SXMP1B117

## (undated) DEVICE — ELECTRODE PT RET AD L9FT HI MOIST COND ADH HYDRGEL CORDED

## (undated) DEVICE — SYRINGE IRRIG 60ML SFT PLIABLE BLB EZ TO GRP 1 HND USE W/

## (undated) DEVICE — SUTURE MNFLMNT SH 26 MM 1/2 CI CRCLE TPR PNT SYMTRC PD PLU

## (undated) DEVICE — SUTURE MONOCRYL + ABSORBABLE MONOFILAMENT 2-0 CT1 12 IN UD SXMP1B412

## (undated) DEVICE — IV STRT KT 3282] LSL INDUSTRIES INC]

## (undated) DEVICE — BITEBLOCK ENDOSCP 60FR MAXI WHT POLYETH STURDY W/ VELC WVN

## (undated) DEVICE — ESOPHAGEAL BALLOON DILATATION CATHETER: Brand: CRE FIXED WIRE

## (undated) DEVICE — CATHETER IV 22GA L1IN OD0.8382-0.9144MM ID0.6096-0.6858MM 382523

## (undated) DEVICE — INTRODUCER SHTH 0.018 IN 4 FRX40 CM KT SFT TIP NIT SS VSI

## (undated) DEVICE — SUTURE PERMA-HAND SZ 0 L30IN NONABSORBABLE BLK L36MM CT-1 424H

## (undated) DEVICE — SYRINGE MED 5ML STD CLR PLAS LUERLOCK TIP N CTRL DISP

## (undated) DEVICE — GUIDEWIRE VASC L80CM DIA0.018IN TIP L5CM 15DEG ANG NIT

## (undated) DEVICE — ELECTRODE,RADIOTRANSLUCENT,FOAM,3PK: Brand: MEDLINE

## (undated) DEVICE — BLUNTFILL WITH FILTER: Brand: MONOJECT

## (undated) DEVICE — APPLICATOR MEDICATED 26 CC SOLUTION HI LT ORNG CHLORAPREP

## (undated) DEVICE — INTRODUCER SHTH L13CM OD7FR SH ORNG HUB SEAMLESS SAFSHTH

## (undated) DEVICE — 1200 GUARD II KIT W/5MM TUBE W/O VAC TUBE: Brand: GUARDIAN

## (undated) DEVICE — 3M™ IOBAN™ 2 ANTIMICROBIAL INCISE DRAPE 6640EZ: Brand: IOBAN™ 2

## (undated) DEVICE — SYRINGE MEDICAL 3ML CLEAR PLASTIC STANDARD NON CONTROL LUERLOCK TIP DISPOSABLE

## (undated) DEVICE — CANNULA CUSH AD W/ 14FT TBG

## (undated) DEVICE — BLUNTFILL: Brand: MONOJECT

## (undated) DEVICE — SYRINGE INFL 60ML DISP ALLIANCE II

## (undated) DEVICE — KIT COLON W/ 1.1OZ LUB AND 2 END

## (undated) DEVICE — DRESSING ANTIMIC FOAM OPTIFOAM POSTOP ADH 4 X 6 IN

## (undated) DEVICE — SET ADMIN 16ML TBNG L100IN 2 Y INJ SITE IV PIGGY BK DISP (ORDER IN MULIPLES OF 48)

## (undated) DEVICE — COVER US PRB W15XL120CM W/ GEL RUBBERBAND TAPE STRP FLD GEN